# Patient Record
Sex: FEMALE | Race: BLACK OR AFRICAN AMERICAN | Employment: FULL TIME | URBAN - METROPOLITAN AREA
[De-identification: names, ages, dates, MRNs, and addresses within clinical notes are randomized per-mention and may not be internally consistent; named-entity substitution may affect disease eponyms.]

---

## 2019-05-06 ENCOUNTER — HOSPITAL ENCOUNTER (EMERGENCY)
Age: 24
Discharge: HOME | End: 2019-05-06
Attending: EMERGENCY MEDICINE
Payer: MEDICAID

## 2019-05-06 VITALS
OXYGEN SATURATION: 100 % | DIASTOLIC BLOOD PRESSURE: 83 MMHG | HEART RATE: 72 BPM | TEMPERATURE: 98.8 F | SYSTOLIC BLOOD PRESSURE: 125 MMHG | RESPIRATION RATE: 16 BRPM

## 2019-05-06 DIAGNOSIS — R31.9 URINARY TRACT INFECTION WITH HEMATURIA, SITE UNSPECIFIED: ICD-10-CM

## 2019-05-06 DIAGNOSIS — J06.9 UPPER RESPIRATORY TRACT INFECTION, UNSPECIFIED TYPE: Primary | ICD-10-CM

## 2019-05-06 DIAGNOSIS — N39.0 URINARY TRACT INFECTION WITH HEMATURIA, SITE UNSPECIFIED: ICD-10-CM

## 2019-05-06 LAB
APPEARANCE UR: ABNORMAL
BACTERIA URNS QL MICRO: NEGATIVE /HPF
BILIRUB UR QL: NEGATIVE
COLOR UR: ABNORMAL
EPITH CASTS URNS QL MICRO: ABNORMAL /LPF
FLUAV AG NPH QL IA: NEGATIVE
FLUBV AG NOSE QL IA: NEGATIVE
GLUCOSE UR STRIP.AUTO-MCNC: NEGATIVE MG/DL
HCG UR QL: NEGATIVE
HGB UR QL STRIP: ABNORMAL
HYALINE CASTS URNS QL MICRO: ABNORMAL /LPF (ref 0–5)
KETONES UR QL STRIP.AUTO: 40 MG/DL
LEUKOCYTE ESTERASE UR QL STRIP.AUTO: ABNORMAL
NITRITE UR QL STRIP.AUTO: NEGATIVE
PH UR STRIP: 5.5 [PH] (ref 5–8)
PROT UR STRIP-MCNC: ABNORMAL MG/DL
RBC #/AREA URNS HPF: ABNORMAL /HPF (ref 0–5)
S PYO AG THROAT QL: NEGATIVE
SP GR UR REFRACTOMETRY: 1.02 (ref 1–1.03)
UR CULT HOLD, URHOLD: NORMAL
UROBILINOGEN UR QL STRIP.AUTO: 1 EU/DL (ref 0.2–1)
WBC URNS QL MICRO: ABNORMAL /HPF (ref 0–4)

## 2019-05-06 PROCEDURE — 81001 URINALYSIS AUTO W/SCOPE: CPT

## 2019-05-06 PROCEDURE — 87804 INFLUENZA ASSAY W/OPTIC: CPT

## 2019-05-06 PROCEDURE — 81025 URINE PREGNANCY TEST: CPT

## 2019-05-06 PROCEDURE — 87880 STREP A ASSAY W/OPTIC: CPT

## 2019-05-06 PROCEDURE — 87086 URINE CULTURE/COLONY COUNT: CPT

## 2019-05-06 PROCEDURE — 99284 EMERGENCY DEPT VISIT MOD MDM: CPT

## 2019-05-06 PROCEDURE — 87070 CULTURE OTHR SPECIMN AEROBIC: CPT

## 2019-05-06 PROCEDURE — 74011250637 HC RX REV CODE- 250/637: Performed by: PHYSICIAN ASSISTANT

## 2019-05-06 RX ORDER — CEPHALEXIN 500 MG/1
500 CAPSULE ORAL 2 TIMES DAILY
Qty: 14 CAP | Refills: 0 | Status: SHIPPED | OUTPATIENT
Start: 2019-05-06 | End: 2019-05-13

## 2019-05-06 RX ORDER — IBUPROFEN 600 MG/1
600 TABLET ORAL
Status: COMPLETED | OUTPATIENT
Start: 2019-05-06 | End: 2019-05-06

## 2019-05-06 RX ORDER — CEPHALEXIN 500 MG/1
500 CAPSULE ORAL
Status: COMPLETED | OUTPATIENT
Start: 2019-05-06 | End: 2019-05-06

## 2019-05-06 RX ORDER — IBUPROFEN 600 MG/1
600 TABLET ORAL
Qty: 20 TAB | Refills: 0 | Status: SHIPPED | OUTPATIENT
Start: 2019-05-06 | End: 2019-09-06 | Stop reason: ALTCHOICE

## 2019-05-06 RX ADMIN — CEPHALEXIN 500 MG: 500 CAPSULE ORAL at 22:00

## 2019-05-06 RX ADMIN — IBUPROFEN 600 MG: 600 TABLET ORAL at 21:01

## 2019-05-06 NOTE — LETTER
[address] 
[address] [place], [place] [address]ng[facility] [place] [facility] [phone] 
[phone] Work/School Note Date: 5/6/2019 To Whom It May concern: 
 
Sandra Santiago was seen and treated today in the emergency room by the following provider(s): 
Attending Provider: Gunner Astudillo MD 
Physician Assistant: ADRIAN Colvin. Sandra Santiago may return to work on Thursday; sooner if symptoms improve. Sincerely, ADRIAN Allen

## 2019-05-07 NOTE — DISCHARGE INSTRUCTIONS
Patient Education        Upper Respiratory Infection (Cold): Care Instructions  Your Care Instructions    An upper respiratory infection, or URI, is an infection of the nose, sinuses, or throat. URIs are spread by coughs, sneezes, and direct contact. The common cold is the most frequent kind of URI. The flu and sinus infections are other kinds of URIs. Almost all URIs are caused by viruses. Antibiotics won't cure them. But you can treat most infections with home care. This may include drinking lots of fluids and taking over-the-counter pain medicine. You will probably feel better in 4 to 10 days. The doctor has checked you carefully, but problems can develop later. If you notice any problems or new symptoms, get medical treatment right away. Follow-up care is a key part of your treatment and safety. Be sure to make and go to all appointments, and call your doctor if you are having problems. It's also a good idea to know your test results and keep a list of the medicines you take. How can you care for yourself at home? · To prevent dehydration, drink plenty of fluids, enough so that your urine is light yellow or clear like water. Choose water and other caffeine-free clear liquids until you feel better. If you have kidney, heart, or liver disease and have to limit fluids, talk with your doctor before you increase the amount of fluids you drink. · Take an over-the-counter pain medicine, such as acetaminophen (Tylenol), ibuprofen (Advil, Motrin), or naproxen (Aleve). Read and follow all instructions on the label. · Before you use cough and cold medicines, check the label. These medicines may not be safe for young children or for people with certain health problems. · Be careful when taking over-the-counter cold or flu medicines and Tylenol at the same time. Many of these medicines have acetaminophen, which is Tylenol. Read the labels to make sure that you are not taking more than the recommended dose.  Too much acetaminophen (Tylenol) can be harmful. · Get plenty of rest.  · Do not smoke or allow others to smoke around you. If you need help quitting, talk to your doctor about stop-smoking programs and medicines. These can increase your chances of quitting for good. When should you call for help? Call 911 anytime you think you may need emergency care. For example, call if:    · You have severe trouble breathing.    Call your doctor now or seek immediate medical care if:    · You seem to be getting much sicker.     · You have new or worse trouble breathing.     · You have a new or higher fever.     · You have a new rash.    Watch closely for changes in your health, and be sure to contact your doctor if:    · You have a new symptom, such as a sore throat, an earache, or sinus pain.     · You cough more deeply or more often, especially if you notice more mucus or a change in the color of your mucus.     · You do not get better as expected. Where can you learn more? Go to http://olga-rico.info/. Enter Z165 in the search box to learn more about \"Upper Respiratory Infection (Cold): Care Instructions. \"  Current as of: September 5, 2018  Content Version: 11.9  © 5136-0255 [employer]. Care instructions adapted under license by [employer] (which disclaims liability or warranty for this information). If you have questions about a medical condition or this instruction, always ask your healthcare professional. [place] [address] any warranty or liability for your use of this information. Patient Education        Urinary Tract Infection in Women: Care Instructions  Your Care Instructions    A urinary tract infection, or UTI, is a general term for an infection anywhere between the kidneys and the urethra (where urine comes out). Most UTIs are bladder infections. They often cause pain or burning when you urinate.   UTIs are caused by bacteria and can be cured with antibiotics. Be sure to complete your treatment so that the infection goes away. Follow-up care is a key part of your treatment and safety. Be sure to make and go to all appointments, and call your doctor if you are having problems. It's also a good idea to know your test results and keep a list of the medicines you take. How can you care for yourself at home? · Take your antibiotics as directed. Do not stop taking them just because you feel better. You need to take the full course of antibiotics. · Drink extra water and other fluids for the next day or two. This may help wash out the bacteria that are causing the infection. (If you have kidney, heart, or liver disease and have to limit fluids, talk with your doctor before you increase your fluid intake.)  · Avoid drinks that are carbonated or have caffeine. They can irritate the bladder. · Urinate often. Try to empty your bladder each time. · To relieve pain, take a hot bath or lay a heating pad set on low over your lower belly or genital area. Never go to sleep with a heating pad in place. To prevent UTIs  · Drink plenty of water each day. This helps you urinate often, which clears bacteria from your system. (If you have kidney, heart, or liver disease and have to limit fluids, talk with your doctor before you increase your fluid intake.)  · Urinate when you need to. · Urinate right after you have sex. · Change sanitary pads often. · Avoid douches, bubble baths, feminine hygiene sprays, and other feminine hygiene products that have deodorants. · After going to the bathroom, wipe from front to back. When should you call for help? Call your doctor now or seek immediate medical care if:    · Symptoms such as fever, chills, nausea, or vomiting get worse or appear for the first time.     · You have new pain in your back just below your rib cage.  This is called flank pain.     · There is new blood or pus in your urine.     · You have any problems with your antibiotic medicine.    Watch closely for changes in your health, and be sure to contact your doctor if:    · You are not getting better after taking an antibiotic for 2 days.     · Your symptoms go away but then come back. Where can you learn more? Go to http://olga-rico.info/. Enter Y270 in the search box to learn more about \"Urinary Tract Infection in Women: Care Instructions. \"  Current as of: March 20, 2018  Content Version: 11.9  © 5999-4481 [employer], "[employer]. Care instructions adapted under license by [employer] (which disclaims liability or warranty for this information). If you have questions about a medical condition or this instruction, always ask your healthcare professional. [address] any warranty or liability for your use of this information.

## 2019-05-07 NOTE — ED PROVIDER NOTES
27 y.o. female with no significant past medical history, presents ambulatory to the ED accompanied by mother with chief complaint of generalized body aches. Patient states that yesterday (5/5/19) she developed generalized body aches, a sore throat, chills, and headaches. She took a dose of Theraflu yesterday without significant relief to her symptoms, and has not taken any medications today. Patient notes that her symptoms have persisted, and now she rates her discomfort as a 6/10 in severity. She additionally complains of a mild, non-productive cough. Patient denies known sick contacts, and also denies receiving her flu vaccine this season. She specifically denies measured fevers, chest pain, abdominal pain, or urinary symptoms. There are no other acute medical concerns at this time. Social hx: Denies Tobacco use; Denies EtOH use; Denies Illicit Drug Abuse    PCP: Stephanie Ryder MD     Note written by Breonna Pressley. Ruthie Patterson, as dictated by Flaco Cuadra PA-C 8:44 PM     The history is provided by the patient. No  was used. Past Medical History:   Diagnosis Date    Asthma        History reviewed. No pertinent surgical history. History reviewed. No pertinent family history.     Social History     Socioeconomic History    Marital status: SINGLE     Spouse name: Not on file    Number of children: Not on file    Years of education: Not on file    Highest education level: Not on file   Occupational History    Not on file   Social Needs    Financial resource strain: Not on file    Food insecurity:     Worry: Not on file     Inability: Not on file    Transportation needs:     Medical: Not on file     Non-medical: Not on file   Tobacco Use    Smoking status: Passive Smoke Exposure - Never Smoker    Smokeless tobacco: Current User   Substance and Sexual Activity    Alcohol use: Never     Frequency: Never    Drug use: Not on file    Sexual activity: Not on file Lifestyle    Physical activity:     Days per week: Not on file     Minutes per session: Not on file    Stress: Not on file   Relationships    Social connections:     Talks on phone: Not on file     Gets together: Not on file     Attends Yazidi service: Not on file     Active member of club or organization: Not on file     Attends meetings of clubs or organizations: Not on file     Relationship status: Not on file    Intimate partner violence:     Fear of current or ex partner: Not on file     Emotionally abused: Not on file     Physically abused: Not on file     Forced sexual activity: Not on file   Other Topics Concern    Not on file   Social History Narrative    Not on file         ALLERGIES: Patient has no known allergies. Review of Systems   Constitutional: Positive for chills. Negative for fever. HENT: Positive for sore throat. Negative for ear discharge. Eyes: Negative for photophobia, pain, discharge and visual disturbance. Respiratory: Positive for cough. Negative for apnea, chest tightness and shortness of breath. Cardiovascular: Negative for chest pain, palpitations and leg swelling. Gastrointestinal: Negative for abdominal distention, abdominal pain and blood in stool. Genitourinary: Negative for difficulty urinating, dysuria, flank pain, frequency and hematuria. Musculoskeletal: Positive for myalgias (generalized). Negative for back pain, gait problem, joint swelling and neck pain. Skin: Negative for color change and pallor. Neurological: Positive for headaches. Negative for dizziness, syncope, weakness and numbness. Psychiatric/Behavioral: Negative for behavioral problems and confusion. The patient is not nervous/anxious. Vitals:    05/06/19 1901 05/06/19 2049   BP:  (!) 147/99   Pulse: (!) 113 (!) 101   Resp:  16   Temp:  99.9 °F (37.7 °C)   SpO2: 99% 100%            Physical Exam   Constitutional: She is oriented to person, place, and time.  She appears well-nourished. No distress. HENT:   Head: Normocephalic and atraumatic. Nose: Rhinorrhea (clear) present. Mouth/Throat: Posterior oropharyngeal erythema (mild) present. No oropharyngeal exudate or posterior oropharyngeal edema. No tonsillar exudate. No difficulty swallowing   Eyes: Pupils are equal, round, and reactive to light. Neck: Normal range of motion. No meningeal signs   Cardiovascular: Normal rate and regular rhythm. Pulmonary/Chest: Effort normal and breath sounds normal.   Abdominal: Soft. There is no tenderness. Musculoskeletal: Normal range of motion. She exhibits no edema or tenderness. Neurological: She is alert and oriented to person, place, and time. Skin: Skin is warm and dry. Psychiatric: She has a normal mood and affect. Nursing note and vitals reviewed. Note written by Danford Frankel. Carole Ramirez, as dictated by Adis Johnson PA-C 8:44 PM        MDM  Number of Diagnoses or Management Options  Upper respiratory tract infection, unspecified type:   Urinary tract infection with hematuria, site unspecified:      Amount and/or Complexity of Data Reviewed  Clinical lab tests: ordered and reviewed  Discuss the patient with other providers: yes           Procedures    PROGRESS NOTE:  10:09 PM  Patient has been reassessed. Feeling better. Reviewed labs and medications with patient. Ready to discharge home. 10:11 PM  Patient's results have been reviewed with them. Patient and/or family have verbally conveyed their understanding and agreement of the patient's signs, symptoms, diagnosis, treatment and prognosis and additionally agree to follow up as recommended or return to the Emergency Room should their condition change prior to follow-up.   Discharge instructions have also been provided to the patient with some educational information regarding their diagnosis as well a list of reasons why they would want to return to the ER prior to their follow-up appointment should their condition change.    ADRIAN Kraft

## 2019-05-07 NOTE — ED TRIAGE NOTES
John comes in [facility]. \"I have a really bad sore throat. I also have chills and body aches real bad. \" States she took Thermaflu yesterday.

## 2019-05-07 NOTE — ED NOTES
Patient has been medically cleared for discharge at this time. She was given all discharge papers and education at this time. Seen leaving the department with a steady gait and all belongings.

## 2019-05-08 LAB
BACTERIA SPEC CULT: NORMAL
BACTERIA SPEC CULT: NORMAL
CC UR VC: NORMAL
SERVICE CMNT-IMP: NORMAL
SERVICE CMNT-IMP: NORMAL

## 2019-06-21 ENCOUNTER — HOSPITAL ENCOUNTER (EMERGENCY)
Age: 24
Discharge: HOME | End: 2019-06-21
Attending: EMERGENCY MEDICINE
Payer: MEDICAID

## 2019-06-21 ENCOUNTER — APPOINTMENT (OUTPATIENT)
Dept: ULTRASOUND IMAGING | Age: 24
End: 2019-06-21
Attending: NURSE PRACTITIONER
Payer: MEDICAID

## 2019-06-21 VITALS
DIASTOLIC BLOOD PRESSURE: 73 MMHG | SYSTOLIC BLOOD PRESSURE: 131 MMHG | TEMPERATURE: 98.6 F | HEART RATE: 85 BPM | RESPIRATION RATE: 16 BRPM | OXYGEN SATURATION: 100 %

## 2019-06-21 DIAGNOSIS — K21.9 GASTROESOPHAGEAL REFLUX DISEASE, ESOPHAGITIS PRESENCE NOT SPECIFIED: Primary | ICD-10-CM

## 2019-06-21 LAB
ALBUMIN SERPL-MCNC: 3.9 G/DL (ref 3.5–5)
ALBUMIN/GLOB SERPL: 1 {RATIO} (ref 1.1–2.2)
ALP SERPL-CCNC: 52 U/L (ref 45–117)
ALT SERPL-CCNC: 18 U/L (ref 12–78)
ANION GAP SERPL CALC-SCNC: 7 MMOL/L (ref 5–15)
APPEARANCE UR: CLEAR
AST SERPL-CCNC: 16 U/L (ref 15–37)
ATRIAL RATE: 81 BPM
BACTERIA URNS QL MICRO: NEGATIVE /HPF
BASOPHILS # BLD: 0 K/UL (ref 0–0.1)
BASOPHILS NFR BLD: 0 % (ref 0–1)
BILIRUB SERPL-MCNC: 0.2 MG/DL (ref 0.2–1)
BILIRUB UR QL: NEGATIVE
BUN SERPL-MCNC: 12 MG/DL (ref 6–20)
BUN/CREAT SERPL: 16 (ref 12–20)
CALCIUM SERPL-MCNC: 9.1 MG/DL (ref 8.5–10.1)
CALCULATED R AXIS, ECG10: 88 DEGREES
CALCULATED T AXIS, ECG11: 70 DEGREES
CHLORIDE SERPL-SCNC: 109 MMOL/L (ref 97–108)
CO2 SERPL-SCNC: 25 MMOL/L (ref 21–32)
COLOR UR: ABNORMAL
COMMENT, HOLDF: NORMAL
CREAT SERPL-MCNC: 0.76 MG/DL (ref 0.55–1.02)
DIAGNOSIS, 93000: NORMAL
DIFFERENTIAL METHOD BLD: NORMAL
EOSINOPHIL # BLD: 0.2 K/UL (ref 0–0.4)
EOSINOPHIL NFR BLD: 2 % (ref 0–7)
EPITH CASTS URNS QL MICRO: ABNORMAL /LPF
ERYTHROCYTE [DISTWIDTH] IN BLOOD BY AUTOMATED COUNT: 12.8 % (ref 11.5–14.5)
GLOBULIN SER CALC-MCNC: 4.1 G/DL (ref 2–4)
GLUCOSE SERPL-MCNC: 76 MG/DL (ref 65–100)
GLUCOSE UR STRIP.AUTO-MCNC: NEGATIVE MG/DL
HCG UR QL: NEGATIVE
HCT VFR BLD AUTO: 37.5 % (ref 35–47)
HGB BLD-MCNC: 12.2 G/DL (ref 11.5–16)
HGB UR QL STRIP: ABNORMAL
HYALINE CASTS URNS QL MICRO: ABNORMAL /LPF (ref 0–5)
IMM GRANULOCYTES # BLD AUTO: 0 K/UL (ref 0–0.04)
IMM GRANULOCYTES NFR BLD AUTO: 0 % (ref 0–0.5)
KETONES UR QL STRIP.AUTO: NEGATIVE MG/DL
LEUKOCYTE ESTERASE UR QL STRIP.AUTO: NEGATIVE
LIPASE SERPL-CCNC: 109 U/L (ref 73–393)
LYMPHOCYTES # BLD: 2.5 K/UL (ref 0.8–3.5)
LYMPHOCYTES NFR BLD: 29 % (ref 12–49)
MCH RBC QN AUTO: 29.9 PG (ref 26–34)
MCHC RBC AUTO-ENTMCNC: 32.5 G/DL (ref 30–36.5)
MCV RBC AUTO: 91.9 FL (ref 80–99)
MONOCYTES # BLD: 0.7 K/UL (ref 0–1)
MONOCYTES NFR BLD: 8 % (ref 5–13)
NEUTS SEG # BLD: 5.3 K/UL (ref 1.8–8)
NEUTS SEG NFR BLD: 61 % (ref 32–75)
NITRITE UR QL STRIP.AUTO: NEGATIVE
NRBC # BLD: 0 K/UL (ref 0–0.01)
NRBC BLD-RTO: 0 PER 100 WBC
PH UR STRIP: 5.5 [PH] (ref 5–8)
PLATELET # BLD AUTO: 292 K/UL (ref 150–400)
PMV BLD AUTO: 11.7 FL (ref 8.9–12.9)
POTASSIUM SERPL-SCNC: 3.3 MMOL/L (ref 3.5–5.1)
PROT SERPL-MCNC: 8 G/DL (ref 6.4–8.2)
PROT UR STRIP-MCNC: NEGATIVE MG/DL
Q-T INTERVAL, ECG07: 360 MS
QRS DURATION, ECG06: 82 MS
QTC CALCULATION (BEZET), ECG08: 420 MS
RBC # BLD AUTO: 4.08 M/UL (ref 3.8–5.2)
RBC #/AREA URNS HPF: ABNORMAL /HPF (ref 0–5)
SAMPLES BEING HELD,HOLD: NORMAL
SODIUM SERPL-SCNC: 141 MMOL/L (ref 136–145)
SP GR UR REFRACTOMETRY: 1.02 (ref 1–1.03)
TROPONIN I BLD-MCNC: <0.04 NG/ML (ref 0–0.08)
UROBILINOGEN UR QL STRIP.AUTO: 0.2 EU/DL (ref 0.2–1)
VENTRICULAR RATE, ECG03: 82 BPM
WBC # BLD AUTO: 8.7 K/UL (ref 3.6–11)
WBC URNS QL MICRO: ABNORMAL /HPF (ref 0–4)

## 2019-06-21 PROCEDURE — 96374 THER/PROPH/DIAG INJ IV PUSH: CPT

## 2019-06-21 PROCEDURE — 83690 ASSAY OF LIPASE: CPT

## 2019-06-21 PROCEDURE — 81001 URINALYSIS AUTO W/SCOPE: CPT

## 2019-06-21 PROCEDURE — 85025 COMPLETE CBC W/AUTO DIFF WBC: CPT

## 2019-06-21 PROCEDURE — 81025 URINE PREGNANCY TEST: CPT

## 2019-06-21 PROCEDURE — 76705 ECHO EXAM OF ABDOMEN: CPT

## 2019-06-21 PROCEDURE — 80053 COMPREHEN METABOLIC PANEL: CPT

## 2019-06-21 PROCEDURE — 74011250636 HC RX REV CODE- 250/636: Performed by: NURSE PRACTITIONER

## 2019-06-21 PROCEDURE — 99284 EMERGENCY DEPT VISIT MOD MDM: CPT

## 2019-06-21 PROCEDURE — 36415 COLL VENOUS BLD VENIPUNCTURE: CPT

## 2019-06-21 PROCEDURE — 74011250637 HC RX REV CODE- 250/637: Performed by: NURSE PRACTITIONER

## 2019-06-21 PROCEDURE — 74011000250 HC RX REV CODE- 250: Performed by: NURSE PRACTITIONER

## 2019-06-21 PROCEDURE — 84484 ASSAY OF TROPONIN QUANT: CPT

## 2019-06-21 PROCEDURE — 93005 ELECTROCARDIOGRAM TRACING: CPT

## 2019-06-21 RX ORDER — IBUPROFEN 600 MG/1
TABLET ORAL
Refills: 0 | COMMUNITY
Start: 2019-05-07 | End: 2019-09-06 | Stop reason: ALTCHOICE

## 2019-06-21 RX ORDER — FAMOTIDINE 20 MG/1
20 TABLET, FILM COATED ORAL 2 TIMES DAILY
Qty: 20 TAB | Refills: 0 | Status: SHIPPED | OUTPATIENT
Start: 2019-06-21 | End: 2019-07-01

## 2019-06-21 RX ADMIN — FAMOTIDINE 20 MG: 10 INJECTION, SOLUTION INTRAVENOUS at 12:34

## 2019-06-21 RX ADMIN — LIDOCAINE HYDROCHLORIDE 40 ML: 20 SOLUTION ORAL; TOPICAL at 12:33

## 2019-06-21 NOTE — ED PROVIDER NOTES
Initial Complaint: Epigastric Abdominal pain that radiates up to chest    Started: 2 days ago    Endorses: Worse than menstrual cramping. CP. Denies: N/V, SOB, F/C, diarrhea or constipation    LMP: June 18. Has been taking motrin for pain for cramps. Does have bad menstrual cycles, but this is worse than usual    Family history of endometriosis. Never had pelvic exam. Not sexual active. Made better: nothing  Made worse: nothing    No further complaints. No past medical history on file. No past surgical history on file. Reviewed      Primary care provider: No primary care provider on file. The history is provided by the patient and a parent. No  was used. No past medical history on file. No past surgical history on file. No family history on file.     Social History     Socioeconomic History    Marital status: Not on file     Spouse name: Not on file    Number of children: Not on file    Years of education: Not on file    Highest education level: Not on file   Occupational History    Not on file   Social Needs    Financial resource strain: Not on file    Food insecurity:     Worry: Not on file     Inability: Not on file    Transportation needs:     Medical: Not on file     Non-medical: Not on file   Tobacco Use    Smoking status: Not on file   Substance and Sexual Activity    Alcohol use: Not on file    Drug use: Not on file    Sexual activity: Not on file   Lifestyle    Physical activity:     Days per week: Not on file     Minutes per session: Not on file    Stress: Not on file   Relationships    Social connections:     Talks on phone: Not on file     Gets together: Not on file     Attends Quaker service: Not on file     Active member of club or organization: Not on file     Attends meetings of clubs or organizations: Not on file     Relationship status: Not on file    Intimate partner violence:     Fear of current or ex partner: Not on file Emotionally abused: Not on file     Physically abused: Not on file     Forced sexual activity: Not on file   Other Topics Concern    Not on file   Social History Narrative    Not on file     ALLERGIES: Patient has no allergy information on record. Review of Systems   Constitutional: Negative for chills, fatigue and fever. Respiratory: Negative for chest tightness and shortness of breath. Cardiovascular: Positive for chest pain. Gastrointestinal: Positive for abdominal pain. Negative for abdominal distention, blood in stool, constipation, diarrhea, nausea and vomiting. Genitourinary: Negative for dysuria and menstrual problem. Musculoskeletal: Negative. Psychiatric/Behavioral: Negative. All other systems reviewed and are negative. Vitals:    06/21/19 1157 06/21/19 1211   BP:  131/73   Pulse: 85 85   Resp:  16   Temp:  98.6 °F (37 °C)   SpO2: 100% 100%          Physical Exam   Constitutional: She is oriented to person, place, and time. She appears well-developed and well-nourished. HENT:   Head: Normocephalic and atraumatic. Neck: Normal range of motion. Neck supple. Cardiovascular: Normal rate, regular rhythm, normal heart sounds and intact distal pulses. Pulmonary/Chest: Effort normal and breath sounds normal. No respiratory distress. She has no wheezes. She has no rales. She exhibits no tenderness. Abdominal: Soft. Normal appearance and bowel sounds are normal. There is generalized tenderness. There is guarding. There is no rigidity, no rebound and no CVA tenderness. Musculoskeletal: Normal range of motion. Neurological: She is alert and oriented to person, place, and time. Skin: Skin is warm and dry. No erythema. Psychiatric: She has a normal mood and affect. Her behavior is normal. Judgment and thought content normal.   Nursing note and vitals reviewed.      [facility]       Procedures    Assessment & Plan:     Orders Placed This Encounter    URINE CULTURE HOLD SAMPLE    US ABD LTD    CBC WITH AUTOMATED DIFF    METABOLIC PANEL, COMPREHENSIVE    LIPASE    Hold Sample    URINALYSIS W/MICROSCOPIC    POC URINE PREGNANCY TEST    POC TROPONIN-I    EKG 12 LEAD INITIAL    ibuprofen (MOTRIN) 600 mg tablet    mylanta/viscous lidocaine (GI COCKTAIL)    famotidine (PF) (PEPCID) 20 mg in sodium chloride 0.9% 10 mL injection       Discussed with Feliciano Andrea MD,ED Provider    Fadumo Escalante NP  06/21/19  12:05 PM    Labs and imaging without acute findings. Follow-up with PCP. Discussed return precautions. Pepcid BID x 10 days. Referral to GI.       LABORATORY TESTS:  Labs Reviewed   METABOLIC PANEL, COMPREHENSIVE - Abnormal; Notable for the following components:       Result Value    Potassium 3.3 (*)     Chloride 109 (*)     Globulin 4.1 (*)     A-G Ratio 1.0 (*)     All other components within normal limits   URINALYSIS W/MICROSCOPIC - Abnormal; Notable for the following components:    Blood MODERATE (*)     All other components within normal limits   URINE CULTURE HOLD SAMPLE   CBC WITH AUTOMATED DIFF   LIPASE   SAMPLES BEING HELD   HCG URINE, QL. - POC   POC TROPONIN-I   POC TROPONIN-I       IMAGING RESULTS:  [employer]    Result Date: 6/21/2019  ULTRASOUND OF THE RIGHT UPPER QUADRANT INDICATION: Epigasrtic/RUQ pain COMPARISON: None. TECHNIQUE: Sonography of the right upper quadrant was performed. FINDINGS: GALLBLADDER: No gallstones, wall thickening, or pericholecystic fluid. COMMON DUCT: 0.2 cm in diameter. The duct is normal caliber. LIVER: Normal echogenicity. No focal hepatic mass or intrahepatic biliary dilatation is shown. PANCREAS: The visualized portions of the pancreas are normal. RIGHT KIDNEY: 9.2 cm in length. No hydronephrosis, shadowing calculus, or contour-deforming renal mass. IMPRESSION: Normal right upper quadrant ultrasound.     MEDICATIONS GIVEN:  Medications   mylanta/viscous lidocaine (GI COCKTAIL) (40 mL Oral Given 6/21/19 1233)   famotidine (PF) (PEPCID) 20 mg in sodium chloride 0.9% 10 mL injection (20 mg IntraVENous Given 6/21/19 1234)       IMPRESSION:  1. Gastroesophageal reflux disease, esophagitis presence not specified        PLAN:  1. Current Discharge Medication List      START taking these medications    Details   famotidine (PEPCID) 20 mg tablet Take 1 Tab by mouth two (2) times a day for 10 days. Qty: 20 Tab, Refills: 0           2. Follow-up Information     Follow up With Specialties Details Why Contact Info    Kuldeep Ward MD Internal Medicine Schedule an appointment as soon as possible for a visit  [address]  [address] [phone]      [facility]  Schedule an appointment as soon as possible for a visit If symptoms worsen nor do not improve [address] [facility] [phone]    [place] [address] [facility] Emergency Medicine  As needed, If symptoms worsen [facility] [phone]  [phone]        3.      Return to ED for new or worsening symptoms       Lo Merino NP

## 2019-06-21 NOTE — ED NOTES
1238: Patient to [address] via wheelchair accompanied by Jennie

## 2019-06-21 NOTE — DISCHARGE INSTRUCTIONS
Thank you for allowing us to care for you today. Please follow-up with your Primary Care provider in the next 2-3 days if your symptoms do not improve. Plan for home:     Pepcid twice daily for 10 days. Follow-up with Gastroenterology if symptoms do not improve. May be related to Ibuprofen use. Come back to the ER if you have worsening symptoms, vomiting blood, fevers over 100.9, shaking chills, nausea or vomiting. Patient Education        Gastroesophageal Reflux Disease (GERD): Care Instructions  Your Care Instructions    Gastroesophageal reflux disease (GERD) is the backward flow of stomach acid into the esophagus. The esophagus is the tube that leads from your throat to your stomach. A one-way valve prevents the stomach acid from moving up into this tube. When you have GERD, this valve does not close tightly enough. If you have mild GERD symptoms including heartburn, you may be able to control the problem with antacids or over-the-counter medicine. Changing your diet, losing weight, and making other lifestyle changes can also help reduce symptoms. Follow-up care is a key part of your treatment and safety. Be sure to make and go to all appointments, and call your doctor if you are having problems. It's also a good idea to know your test results and keep a list of the medicines you take. How can you care for yourself at home? · Take your medicines exactly as prescribed. Call your doctor if you think you are having a problem with your medicine. · Your doctor may recommend over-the-counter medicine. For mild or occasional indigestion, antacids, such as Tums, Gaviscon, Mylanta, or Maalox, may help. Your doctor also may recommend over-the-counter acid reducers, such as Pepcid AC, Tagamet HB, Zantac 75, or Prilosec. Read and follow all instructions on the label. If you use these medicines often, talk with your doctor. · Change your eating habits.   ? It's best to eat several small meals instead of two or three large meals. ? After you eat, wait 2 to 3 hours before you lie down. ? Chocolate, mint, and alcohol can make GERD worse. ? Spicy foods, foods that have a lot of acid (like tomatoes and oranges), and coffee can make GERD symptoms worse in some people. If your symptoms are worse after you eat a certain food, you may want to stop eating that food to see if your symptoms get better. · Do not smoke or chew tobacco. Smoking can make GERD worse. If you need help quitting, talk to your doctor about stop-smoking programs and medicines. These can increase your chances of quitting for good. · If you have GERD symptoms at night, raise the head of your bed 6 to 8 inches by putting the frame on blocks or placing a foam wedge under the head of your mattress. (Adding extra pillows does not work.)  · Do not wear tight clothing around your middle. · Lose weight if you need to. Losing just 5 to 10 pounds can help. When should you call for help? Call your doctor now or seek immediate medical care if:    · You have new or different belly pain.     · Your stools are black and tarlike or have streaks of blood.    Watch closely for changes in your health, and be sure to contact your doctor if:    · Your symptoms have not improved after 2 days.     · Food seems to catch in your throat or chest.   Where can you learn more? Go to http://olga-rico.info/. Enter T136 in the search box to learn more about \"Gastroesophageal Reflux Disease (GERD): Care Instructions. \"  Current as of: March 27, 2018  Content Version: 11.9  © 3067-4102 [employer]. Care instructions adapted under license by [employer] (which disclaims liability or warranty for this information). If you have questions about a medical condition or this instruction, always ask your healthcare professional. [address] any warranty or liability for your use of this information.

## 2019-06-21 NOTE — ED NOTES
Pt ambulatory out of ED with discharge instructions and prescriptions in hand given by Jesusita Albrecht NP; pt verbalized understanding of discharge paperwork and time allotted for questions. VSS. Pt alert and oriented.

## 2019-06-21 NOTE — LETTER
[address] 
[address] [facility] [id number] 
[phone] Work/School Note Date: 6/21/2019 To Whom It May concern: 
 
Hailey Andrade was seen and treated today in the emergency room by the following provider(s): 
Attending Provider: Jasen Whittington MD 
Nurse Practitioner: Lisa Puri NP. Hailey Andrade may return to work on June 22, 2019. Sincerely, Melissa Muniz NP

## 2019-06-22 LAB — UR CULT HOLD, URHOLD: NORMAL

## 2019-09-06 ENCOUNTER — OFFICE VISIT (OUTPATIENT)
Dept: BEHAVIORAL/MENTAL HEALTH CLINIC | Age: 24
End: 2019-09-06

## 2019-09-06 VITALS
HEART RATE: 74 BPM | DIASTOLIC BLOOD PRESSURE: 75 MMHG | SYSTOLIC BLOOD PRESSURE: 125 MMHG | HEIGHT: 60 IN | BODY MASS INDEX: 21.99 KG/M2 | WEIGHT: 112 LBS

## 2019-09-06 DIAGNOSIS — F32.1 MAJOR DEPRESSIVE DISORDER, SINGLE EPISODE, MODERATE WITH ANXIOUS DISTRESS (HCC): Primary | ICD-10-CM

## 2019-09-06 DIAGNOSIS — Z91.89 COMPLIANCE WITH MEDICATION REGIMEN: ICD-10-CM

## 2019-09-06 DIAGNOSIS — Z86.59 HISTORY OF ATTENTION DEFICIT HYPERACTIVITY DISORDER (ADHD): ICD-10-CM

## 2019-09-06 RX ORDER — BUPROPION HYDROCHLORIDE 150 MG/1
150 TABLET ORAL
Qty: 30 TAB | Refills: 2 | Status: SHIPPED | OUTPATIENT
Start: 2019-09-06 | End: 2019-12-18 | Stop reason: SINTOL

## 2019-09-06 RX ORDER — DEXTROAMPHETAMINE SACCHARATE, AMPHETAMINE ASPARTATE, DEXTROAMPHETAMINE SULFATE AND AMPHETAMINE SULFATE 2.5; 2.5; 2.5; 2.5 MG/1; MG/1; MG/1; MG/1
10 TABLET ORAL 2 TIMES DAILY
Qty: 60 TAB | Refills: 0 | Status: SHIPPED | OUTPATIENT
Start: 2019-09-06 | End: 2019-11-21 | Stop reason: SDUPTHER

## 2019-09-06 RX ORDER — BUPROPION HYDROCHLORIDE 150 MG/1
150 TABLET ORAL
Qty: 30 TAB | Refills: 0 | Status: SHIPPED | OUTPATIENT
Start: 2019-09-06 | End: 2019-09-06

## 2019-09-06 NOTE — PROGRESS NOTES
INITIAL PSYCHIATRIC EVALUATION    IDENTIFICATION:      A. Name: Jyotsna Heart Age:     27 y.o.      C.   MRN: [id number]       D.   CSN:      [id number]      E. Admission Date: (Not on file)       PETRONA   :     1995          SOURCE OF INFORMATION: The patient, past records        CHIEF COMPLAINT:  \" anxiety, depression, poor concentration\"    HPI: Miss Lilo Rodriguez is a 28 y/o single AA female , a sophomore at [employer] who presents to establish care at this clinic. She has been escorted by her mother, Demetrius Marsh. She has been struggling with symptoms of anxious depression for the past few months since her grades soared downwards where she could not complete her tasks, home works,projects and her GPA declined. She received a letter that her financial aid would be cut off unless she raised her grades this semester. IN addition her mother, Fabiana reports that her daughter remains isolative, lacking a social [place] of friends, being home bound. She indicated problems in school for which she was evaluated by [facility] and treated with Adderall which she did not like, \" don't like to take medications/drugs\". Further exploration indicated a lack of interest in sex, men, relationships, but more concerned about her future career,etc. She denies any suicidal thoughts but endorsed them on the screening form because she has  had fleeting thoughts when in doubt of succeeding in school and achieving her goals of becoming a . She has been isolative, withdrawn, sleeping excessively (7am-7pm),sleep pattern is erratic, lacks appetite, does not eat much. She was clearly anhedonic and apathetic  for a 28 y/o. She only smiled once.      Current symptoms: depressed, anxious, tense,irritable, insomnia,concentration deficits,anhedonia,fatigue,appetite ,feeling bad about self,fidgety,death wishes,distractible,racing thoughts,    Duration of symptoms: for past few months      PHQ-9 scores:21/27  HAM-A scores:  Mood Disorder Questionaire scores:3/13      STRESSORS/ Precipitating factors:  School grades,uncertain future    PERSONAL COPING STYLEs :  Has been explosive in the past, breaking things ( per mom), go to sleep, listen to music           REVIEW OF  PSYCHIATRIC SYSTEMS:  Patient did not meet criteria for a Major Depressive Disorder ( W OR W/O  psychotic features) PTSD, Schizophrenia, Bipolar Affective Disorder, Obsessive Compulsive Disorder, Anxiety Disorder, Agoraphobia, EATING DISORDER,SUBSTANCE USE DISORDER,  Psychotic disorders or ASD. PAST PSYCHIATRIC HISTORY:   Outpatient Psychiatric treatments:Treated for ADHD at age 11 (after father's death),[facility],saw  in , but he left. Suicide attempts/self inflictive behaviors:none   Hospitalizations:  none  Medications Tried: Adderall   ECT:   None   Legal/Assault History:  None     PAST SUBSTANCE ABUSE HISTORY:  Denies any drug use, detests alcohol, ( avoids them since her mother was an alcoholic)    FAMILY PSYCH HISTORY: mother and grandmother suffer from depression, father also had alcohol/drug problems    SOCIAL HISTORY: She is the youngest of 4 girls born to a single mother,father  when she was 11, and she lived with her paternal grandmother , then with her stepmother, then with half sister (paternal) ,mother did not have custody due to her drug use. Stayed with mother for the rest of that time. Denies any sexual and physical abuse. Repeated 7th grade. Did graduate from [employer]. Currently in college. Worked for [employer] ( 4months),  Seasonal jobs with [employer], [address] [place] [address]    PAST MEDICAL/SURGICAL HISTORY:  Unremarkable  Other than asthma    Medical review of systems mainly considered within normal limits expect as noted in history above. Pertinent LABS:as of 2019, her CBCd were, CMP,UA were all WNl      ALLERGIES:   She has No Known Allergies.       MENTAL STATUS EXAM:  Orientation person, place, time/date, situation, day of week, month of year and year    Behavior/Eye contact: Good eye contact   Appearance:  Well kempt,appearing his/her age   Motor Behavior:  within normal limits   Speech:  normal pitch, normal volume and non-pressured   Thought Process: goal directed and within normal limits   Thought Content free of delusions and free of hallucinations   Suicidal ideations no plan  and no intention   Homicidal ideations no plan  and no intention   Mood:  Depressed, anxious, irritable   Affect:  mood-congruent, restricted, dysphoric affect   Memory recent  adequate ( 3/3 at 5 minutes)   Memory remote:  adequate recalled the presidents   Concentration:  adequate did well with \"egg question\" and reversal of the months of the year   Abstraction:  abstract ( did well with similarities)   Insight:  intact   Reliability good   Perceptual disortions  Absent( auditory,visual,olfactory,tactile), patient denied         ASSESSMENT:  The patient is a 27 y.o.  female with symptoms of clinical depression, probably chronic but worse in the past few months when pressure has mounted due to educational/cely requirements. She has self doubt and poor self esteem, feeling marginalized by her mother. Suicide/Homicide risk : (Nil,Low, Moderate, High): low    STRENGTHS: healthy, absence of drug abuse, supportive family, insightful, seeking intervention  WEAKNESSES: genetic loading for depression and drug abuse, lack of meaningful relationships outside of the home, lack of leisure activities, maladaptive coping    PROVISIONAL DIAGNOSES:    ICD-10-CM ICD-9-CM   1. Major depressive disorder, single episode, moderate with anxious distress (HCC) F32.1 296.22   2. History of attention deficit hyperactivity disorder (ADHD) Z86.59 V11.8       Orders Placed This Encounter    buPROPion XL (WELLBUTRIN XL) 150 mg tablet     Sig: Take 1 Tab by mouth every morning.      Dispense:  30 Tab     Refill:  0    dextroamphetamine-amphetamine (ADDERALL) 10 mg tablet     Sig: Take 1 Tab by mouth two (2) times a day. Max Daily Amount: 20 mg. Dispense:  60 Tab     Refill:  0       TREATMENT PLAN:       1. Medications: Will start on low Dose Wellbutrin XL 150mg daily and provide Adderall for ADD and monitor closely. The risks and benefits of the proposed medications, including addiction potential with Adderrall, the potential medication side effects and consequences of non-compliance were dicussed. The  patient was given opportunity to ask questions             2. Counseling/ psychotherapy:  Psych-education provided:none  Discussed rational versus irrational thinking patterns and their consequences. Discussed healthy/adaptive and unhealthy/maladaptive coping. 3.  Labs/ tests/ old records/ collateral: consider in the next visit, UDS, TSH,and vitamin levels if she does not respond    4. Follow up : 4-5 weeks    5: If you feel suicidal after hours, please call the [address] [place] [address] @ [phone] OR GO TO THE NEAREST [address] [email or web address] YOU MAY ALSO ACCESS THE SUICIDE HOTLINE @ \"SPEAKING OF SUICIDE. COM/RESOURCES\"    Referrals/Consults: needs individual therapy ( she has probably repressed feelings/memories of the past losses,changes,etc)    Ms. Prateek Marquez has a reminder for a \"due or due soon\" health maintenance. I have asked that she contact her primary care provider for follow-up on this health maintenance. TIME SPENT FACE TO FACE: 61 MINUTES                  SIGNED:    Sultana SHAVONNE Tatum MD,   Adult Psychiatrist/Psychosomatic Medicine  9/6/2019

## 2019-09-14 LAB
ALPRAZ UR QL: NEGATIVE
AMPHETAMINES UR QL SCN: NEGATIVE NG/ML
BARBITURATES UR QL SCN: NEGATIVE NG/ML
BENZODIAZ UR QL: NEGATIVE NG/ML
BZE UR QL SCN: NEGATIVE NG/ML
CANNABINOIDS UR QL SCN: NEGATIVE NG/ML
CLONAZEPAM UR QL: NEGATIVE
CREAT UR-MCNC: 131.4 MG/DL (ref 20–300)
FLURAZEPAM UR QL: NEGATIVE
LORAZEPAM UR QL: NEGATIVE
METHADONE UR QL SCN: NEGATIVE NG/ML
MIDAZOLAM UR QL CFM: NEGATIVE
NORDIAZEPAM UR QL: NEGATIVE
OPIATES UR QL SCN: NEGATIVE NG/ML
OXAZEPAM UR QL: NEGATIVE
OXYCODONE+OXYMORPHONE UR QL SCN: NEGATIVE NG/ML
PCP UR QL: NEGATIVE NG/ML
PH UR: 7.4 [PH] (ref 4.5–8.9)
PLEASE NOTE:, 733157: NORMAL
PROPOXYPH UR QL SCN: NEGATIVE NG/ML
SP GR UR: 1.01
TEMAZEPAM UR QL CFM: NEGATIVE
TRIAZOLAM UR QL: NEGATIVE

## 2019-09-20 ENCOUNTER — HOSPITAL ENCOUNTER (EMERGENCY)
Age: 24
Discharge: HOME | End: 2019-09-20
Attending: EMERGENCY MEDICINE | Admitting: EMERGENCY MEDICINE
Payer: MEDICAID

## 2019-09-20 ENCOUNTER — APPOINTMENT (OUTPATIENT)
Dept: GENERAL RADIOLOGY | Age: 24
End: 2019-09-20
Attending: EMERGENCY MEDICINE
Payer: MEDICAID

## 2019-09-20 VITALS
DIASTOLIC BLOOD PRESSURE: 86 MMHG | SYSTOLIC BLOOD PRESSURE: 125 MMHG | TEMPERATURE: 98.2 F | OXYGEN SATURATION: 99 % | HEIGHT: 59 IN | HEART RATE: 89 BPM | WEIGHT: 108 LBS | BODY MASS INDEX: 21.77 KG/M2 | RESPIRATION RATE: 20 BRPM

## 2019-09-20 DIAGNOSIS — R07.89 ATYPICAL CHEST PAIN: Primary | ICD-10-CM

## 2019-09-20 LAB
ANION GAP SERPL CALC-SCNC: 9 MMOL/L (ref 5–15)
BASOPHILS # BLD: 0 K/UL (ref 0–0.1)
BASOPHILS NFR BLD: 0 % (ref 0–1)
BUN SERPL-MCNC: 20 MG/DL (ref 6–20)
BUN/CREAT SERPL: 21 (ref 12–20)
CALCIUM SERPL-MCNC: 9.4 MG/DL (ref 8.5–10.1)
CHLORIDE SERPL-SCNC: 102 MMOL/L (ref 97–108)
CO2 SERPL-SCNC: 27 MMOL/L (ref 21–32)
CREAT SERPL-MCNC: 0.96 MG/DL (ref 0.55–1.02)
D DIMER PPP FEU-MCNC: <0.19 MG/L FEU (ref 0–0.65)
DIFFERENTIAL METHOD BLD: NORMAL
EOSINOPHIL # BLD: 0.1 K/UL (ref 0–0.4)
EOSINOPHIL NFR BLD: 2 % (ref 0–7)
ERYTHROCYTE [DISTWIDTH] IN BLOOD BY AUTOMATED COUNT: 11.9 % (ref 11.5–14.5)
GLUCOSE SERPL-MCNC: 83 MG/DL (ref 65–100)
HCG SERPL QL: NEGATIVE
HCT VFR BLD AUTO: 39 % (ref 35–47)
HGB BLD-MCNC: 12.7 G/DL (ref 11.5–16)
IMM GRANULOCYTES # BLD AUTO: 0 K/UL (ref 0–0.04)
IMM GRANULOCYTES NFR BLD AUTO: 0 % (ref 0–0.5)
LYMPHOCYTES # BLD: 1.9 K/UL (ref 0.8–3.5)
LYMPHOCYTES NFR BLD: 37 % (ref 12–49)
MCH RBC QN AUTO: 29.3 PG (ref 26–34)
MCHC RBC AUTO-ENTMCNC: 32.6 G/DL (ref 30–36.5)
MCV RBC AUTO: 90.1 FL (ref 80–99)
MONOCYTES # BLD: 0.4 K/UL (ref 0–1)
MONOCYTES NFR BLD: 8 % (ref 5–13)
NEUTS SEG # BLD: 2.7 K/UL (ref 1.8–8)
NEUTS SEG NFR BLD: 53 % (ref 32–75)
NRBC # BLD: 0 K/UL (ref 0–0.01)
NRBC BLD-RTO: 0 PER 100 WBC
PLATELET # BLD AUTO: 255 K/UL (ref 150–400)
PMV BLD AUTO: 11.1 FL (ref 8.9–12.9)
POTASSIUM SERPL-SCNC: 3.7 MMOL/L (ref 3.5–5.1)
RBC # BLD AUTO: 4.33 M/UL (ref 3.8–5.2)
SODIUM SERPL-SCNC: 138 MMOL/L (ref 136–145)
WBC # BLD AUTO: 5 K/UL (ref 3.6–11)

## 2019-09-20 PROCEDURE — 74011000250 HC RX REV CODE- 250: Performed by: EMERGENCY MEDICINE

## 2019-09-20 PROCEDURE — 71046 X-RAY EXAM CHEST 2 VIEWS: CPT

## 2019-09-20 PROCEDURE — 99284 EMERGENCY DEPT VISIT MOD MDM: CPT

## 2019-09-20 PROCEDURE — 93005 ELECTROCARDIOGRAM TRACING: CPT

## 2019-09-20 PROCEDURE — 36415 COLL VENOUS BLD VENIPUNCTURE: CPT

## 2019-09-20 PROCEDURE — 85379 FIBRIN DEGRADATION QUANT: CPT

## 2019-09-20 PROCEDURE — 84703 CHORIONIC GONADOTROPIN ASSAY: CPT

## 2019-09-20 PROCEDURE — 80048 BASIC METABOLIC PNL TOTAL CA: CPT

## 2019-09-20 PROCEDURE — 74011250637 HC RX REV CODE- 250/637: Performed by: EMERGENCY MEDICINE

## 2019-09-20 PROCEDURE — 85025 COMPLETE CBC W/AUTO DIFF WBC: CPT

## 2019-09-20 RX ORDER — FAMOTIDINE 20 MG/1
20 TABLET, FILM COATED ORAL 2 TIMES DAILY
Qty: 20 TAB | Refills: 0 | Status: SHIPPED | OUTPATIENT
Start: 2019-09-20 | End: 2019-09-30

## 2019-09-20 RX ORDER — ACETAMINOPHEN 500 MG
1000 TABLET ORAL
Status: DISCONTINUED | OUTPATIENT
Start: 2019-09-20 | End: 2019-09-20 | Stop reason: HOSPADM

## 2019-09-20 RX ADMIN — LIDOCAINE HYDROCHLORIDE 40 ML: 20 SOLUTION ORAL; TOPICAL at 18:08

## 2019-09-20 NOTE — DISCHARGE INSTRUCTIONS

## 2019-09-20 NOTE — ED NOTES
Emergency Department Nursing Plan of Care       The Nursing Plan of Care is developed from the Nursing assessment and Emergency Department Attending provider initial evaluation. The plan of care may be reviewed in the ED Provider note.     The Plan of Care was developed with the following considerations:   Patient / Family readiness to learn indicated by:verbalized understanding  Persons(s) to be included in education: patient  Barriers to Learning/Limitations:No    Signed     Cristo Anderson RN    9/20/2019   6:10 PM

## 2019-09-20 NOTE — ED TRIAGE NOTES
Pt reports chest pain and back pain x3 days that is intermittent. Pt reports middle chest pain that radiates tot the right. Pt denies any alleviating or aggravating factors. Pt denies any heavy lifting.

## 2019-09-20 NOTE — ED NOTES
Verbal shift change report given to Kristine Turcios RN (oncoming nurse) by Leo Nuñez RN (offgoing nurse). Report included the following information SBAR.

## 2019-09-20 NOTE — ED PROVIDER NOTES
EMERGENCY DEPARTMENT HISTORY AND PHYSICAL EXAM      Date: 9/20/2019  Patient Name: Amador Joy    Please note that this dictation was completed with [employer], the computer voice recognition software. Quite often unanticipated grammatical, syntax, homophones, and other interpretive errors are inadvertently transcribed by the computer software. Please disregard these errors. Please excuse any errors that have escaped final proofreading. History of Presenting Illness     Chief Complaint   Patient presents with    Chest Pain       History Provided By: Patient    HPI: Amador Joy, 27 y.o. female, seen in the emergency department complaining of chest pain. Pain is described as a pressure, it is intermittent. Radiates through to her back into the right side. No associated shortness of breath, nausea, vomiting, cough, unilateral leg pain or leg swelling. Pain is not pleuritic. Denies any other exacerbating or relieving factors. She has not tried anything at home for. She is been seen in the past for similar symptoms. PCP: Eladio Kawasaki, MD    No current facility-administered medications on file prior to encounter. Current Outpatient Medications on File Prior to Encounter   Medication Sig Dispense Refill    dextroamphetamine-amphetamine (ADDERALL) 10 mg tablet Take 1 Tab by mouth two (2) times a day. Max Daily Amount: 20 mg. 60 Tab 0    buPROPion XL (WELLBUTRIN XL) 150 mg tablet Take 1 Tab by mouth every morning. 30 Tab 2       Past History     Past Medical History:  Past Medical History:   Diagnosis Date    ADHD     Asthma     Depression        Past Surgical History:  History reviewed. No pertinent surgical history. Family History:  History reviewed. No pertinent family history.     Social History:  Social History     Tobacco Use    Smoking status: Never Smoker    Smokeless tobacco: Never Used   Substance Use Topics    Alcohol use: Never     Frequency: Never    Drug use: Never Allergies:  No Known Allergies      Review of Systems   Review of Systems   Constitutional: Negative for chills and fever. HENT: Negative for congestion and sore throat. Eyes: Negative for visual disturbance. Respiratory: Negative for cough and shortness of breath. Cardiovascular: Positive for chest pain. Negative for leg swelling. Gastrointestinal: Negative for abdominal pain, blood in stool, diarrhea and nausea. Endocrine: Negative for polyuria. Genitourinary: Negative for dysuria, flank pain, vaginal bleeding and vaginal discharge. Musculoskeletal: Negative for myalgias. Skin: Negative for rash. Allergic/Immunologic: Negative for immunocompromised state. Neurological: Negative for weakness and headaches. Psychiatric/Behavioral: Negative for confusion. Physical Exam   Physical Exam   Constitutional: She is oriented to person, place, and time. She appears well-developed and well-nourished. HENT:   Head: Normocephalic and atraumatic. Moist mucous membranes   Eyes: Pupils are equal, round, and reactive to light. Conjunctivae are normal. Right eye exhibits no discharge. Left eye exhibits no discharge. Neck: Normal range of motion. Neck supple. No tracheal deviation present. Cardiovascular: Normal rate, regular rhythm and normal heart sounds. No murmur heard. Pulmonary/Chest: Effort normal and breath sounds normal. No respiratory distress. She has no wheezes. She has no rales. Abdominal: Soft. Bowel sounds are normal. There is no tenderness. There is no rebound and no guarding. Musculoskeletal: Normal range of motion. She exhibits no edema, tenderness or deformity. Neurological: She is alert and oriented to person, place, and time. Skin: Skin is warm and dry. No rash noted. No erythema. Psychiatric: Her behavior is normal.   Nursing note and vitals reviewed.       Diagnostic Study Results     Labs -   No results found for this or any previous visit (from the past 12 hour(s)). Radiologic Studies -   XR CHEST PA LAT   Final Result   IMPRESSION: No acute abnormality                    CT Results  (Last 48 hours)    None        CXR Results  (Last 48 hours)               09/20/19 1854  XR CHEST PA LAT Final result    Impression:  IMPRESSION: No acute abnormality                       Narrative:  EXAM:  XR CHEST PA LAT       INDICATION:   chest pain       COMPARISON: None. FINDINGS: PA and lateral radiographs of the chest demonstrate clear lungs. The   cardiac and mediastinal contours and pulmonary vascularity are normal.  The   bones and soft tissues are within normal limits. Medical Decision Making   I am the first provider for this patient. I reviewed the vital signs, available nursing notes, past medical history, past surgical history, family history and social history. Vital Signs-Reviewed the patient's vital signs. No data found. EKG interpretation: (Preliminary)  EKG shows sinus rhythm, rate 91. Normal axis. Normal intervals. No evidence of acute ischemic ST or T wave changes. She has RSR prime pattern. QTc measures 428. Records Reviewed: Nursing Notes and Old Medical Records    Provider Notes (Medical Decision Making):   Doubt ACS, doubt pneumonia, pneumothorax. Will check basic labs, chest x-ray, EKG. Will obtain a d-dimer, low clinical concern for PE, but patient is tachycardic and cannot rule out PE via the [facility] rule. ED Course:   Initial assessment performed. The patients presenting problems have been discussed, and they are in agreement with the care plan formulated and outlined with them. I have encouraged them to ask questions as they arise throughout their visit. Critical Care Time:   none    Disposition:  DISCHARGE NOTE  Patients results have been reviewed with them.   Patient and/or family have verbally conveyed their understanding and agreement of the patient's signs, symptoms, diagnosis, treatment and prognosis and additionally agree to follow up as recommended or return to the Emergency Room should their condition change or have any new concerns prior to their follow-up appointment. Patient verbally agrees with the care-plan and verbally conveys that all of their questions have been answered. Discharge instructions have also been provided to the patient with some educational information regarding their diagnosis as well a list of reasons why they would want to return to the ER prior to their follow-up appointment should their condition change. PLAN:  1. Discharge Medication List as of 9/20/2019  6:54 PM      START taking these medications    Details   famotidine (PEPCID) 20 mg tablet Take 1 Tab by mouth two (2) times a day for 10 days. , Normal, Disp-20 Tab, R-0         CONTINUE these medications which have NOT CHANGED    Details   dextroamphetamine-amphetamine (ADDERALL) 10 mg tablet Take 1 Tab by mouth two (2) times a day. Max Daily Amount: 20 mg., Print, Disp-60 Tab, R-0      buPROPion XL (WELLBUTRIN XL) 150 mg tablet Take 1 Tab by mouth every morning., Normal, Disp-30 Tab, R-2           2. Follow-up Information     Follow up With Specialties Details Why Contact Info    Sadiq Maya MD Internal Medicine Schedule an appointment as soon as possible for a visit  [address]  [address] [address] [phone]      [facility] EMERGENCY DEPT Emergency Medicine  If symptoms worsen Tommy Wolfe  [phone]          Return to ED if worse     Diagnosis     Clinical Impression:   1. Atypical chest pain        Attestations:   This note was completed by Sofia Bonilla DO

## 2019-09-20 NOTE — ED NOTES
Discharge summary and discharge medications reviewed with patient and appropriate educational materials and side effects teaching were provided. patient  Given 0 paper prescriptions and 1 electronic prescriptions sent to pt's listed pharmacy. Patient verbalized understanding of the importance of discussing medications with his or her physician or clinic they will be following up with. No si/s of acute distress prior to discharge. Patient offered wheelchair from treatment area to hospital entrance, patient declined wheelchair. Discharged by provider.

## 2019-09-20 NOTE — ED NOTES
Reports chest/back pressure that occurs every 3 months for about 2 years not associated with injury or exertion. Reports that episodes typically last 3 days. Pt denies taking anything OTC for pain. Pt has been seen before  symptoms but no doctor has found what's wrong. Pt explains that symptoms resolve without treatment.

## 2019-09-22 LAB
ATRIAL RATE: 91 BPM
CALCULATED P AXIS, ECG09: 53 DEGREES
CALCULATED R AXIS, ECG10: 84 DEGREES
CALCULATED T AXIS, ECG11: 59 DEGREES
DIAGNOSIS, 93000: NORMAL
P-R INTERVAL, ECG05: 170 MS
Q-T INTERVAL, ECG07: 348 MS
QRS DURATION, ECG06: 80 MS
QTC CALCULATION (BEZET), ECG08: 428 MS
VENTRICULAR RATE, ECG03: 91 BPM

## 2019-10-28 ENCOUNTER — OFFICE VISIT (OUTPATIENT)
Dept: INTERNAL MEDICINE CLINIC | Age: 24
End: 2019-10-28

## 2019-10-28 VITALS
SYSTOLIC BLOOD PRESSURE: 122 MMHG | TEMPERATURE: 98.4 F | DIASTOLIC BLOOD PRESSURE: 68 MMHG | HEART RATE: 86 BPM | BODY MASS INDEX: 21.73 KG/M2 | OXYGEN SATURATION: 96 % | HEIGHT: 59 IN | RESPIRATION RATE: 14 BRPM | WEIGHT: 107.8 LBS

## 2019-10-28 DIAGNOSIS — F32.1 MAJOR DEPRESSIVE DISORDER, SINGLE EPISODE, MODERATE WITH ANXIOUS DISTRESS (HCC): Primary | ICD-10-CM

## 2019-10-28 DIAGNOSIS — F90.9 ATTENTION DEFICIT HYPERACTIVITY DISORDER (ADHD), UNSPECIFIED ADHD TYPE: ICD-10-CM

## 2019-10-28 DIAGNOSIS — Z23 ENCOUNTER FOR IMMUNIZATION: ICD-10-CM

## 2019-10-28 DIAGNOSIS — E55.9 VITAMIN D DEFICIENCY: ICD-10-CM

## 2019-10-28 DIAGNOSIS — Z00.00 ROUTINE GENERAL MEDICAL EXAMINATION AT A HEALTH CARE FACILITY: ICD-10-CM

## 2019-10-28 DIAGNOSIS — R53.83 FATIGUE, UNSPECIFIED TYPE: ICD-10-CM

## 2019-10-28 NOTE — PROGRESS NOTES
Health Maintenance Due   Topic Date Due    HPV Age 9Y-34Y (1 - Female 3-dose series) 05/01/2012    DTaP/Tdap/Td series (1 - Tdap) 05/01/2018    PAP AKA CERVICAL CYTOLOGY  05/01/2018    Influenza Age 5 to Adult  08/01/2019       Chief Complaint   Patient presents with    Complete Physical     New pt; establish care       1. Have you been to the ER, urgent care clinic since your last visit? Hospitalized since your last visit? No    2. Have you seen or consulted any other health care providers outside of the [address] [place] [address] since your last visit? Include any pap smears or colon screening. No    3) Do you have an Advance Directive on file? no    4) Are you interested in receiving information on Advance Directives? NO      Patient is accompanied by self I have received verbal consent from Karyle Seeds to discuss any/all medical information while they are present in the room. Karyle Seeds is a 27 y.o. female  who presents for routine immunization(s) Fluarix Quadrivalent. Patient denies any symptoms , reactions or allergies that would exclude them from being immunized today. Risks and adverse reactions were discussed and the VIS was given to them. Patient voiced full understanding and signed Adult Immunization Consent form. All questions were addressed. Patient was observed for 10 min post injection. There were no reactions observed.     Masoud Olivia LPN

## 2019-10-28 NOTE — PROGRESS NOTES
HISTORY OF PRESENT ILLNESS  Monster Lazaro is a 27 y.o. female here to establish care. She is diagnosed with major depression and ADHD. Seeing psychiatrist Dr. Alannah Santos is helping her a lot. She is comfortable. Feeling tired or a lot. Need lab work. Here for complete physical.  Need lab work. Need flu shot. HPI    Review of Systems   Constitutional: Negative. HENT: Negative. Eyes: Negative. Respiratory: Negative. Cardiovascular: Negative. Gastrointestinal: Negative. Genitourinary: Negative. Musculoskeletal: Negative. Skin: Negative. Neurological: Negative. Endo/Heme/Allergies: Negative. Psychiatric/Behavioral: Negative. Physical Exam   Constitutional: She is oriented to person, place, and time. She appears well-developed and well-nourished. No distress. HENT:   Head: Normocephalic and atraumatic. Right Ear: External ear normal.   Left Ear: External ear normal.   Nose: Nose normal.   Mouth/Throat: Oropharynx is clear and moist. No oropharyngeal exudate. Eyes: Pupils are equal, round, and reactive to light. Conjunctivae and EOM are normal.   Neck: Normal range of motion. Neck supple. No tracheal deviation present. No thyromegaly present. Cardiovascular: Normal rate, regular rhythm, normal heart sounds and intact distal pulses. Pulmonary/Chest: Effort normal and breath sounds normal. No respiratory distress. She has no wheezes. Abdominal: Soft. Bowel sounds are normal. She exhibits no distension. There is no tenderness. Musculoskeletal: She exhibits no edema or tenderness. Neurological: She is alert and oriented to person, place, and time. She has normal reflexes. She displays normal reflexes. No cranial nerve deficit. She exhibits normal muscle tone. Coordination normal.   Psychiatric: She has a normal mood and affect. Her behavior is normal.       ASSESSMENT and PLAN  Diagnoses and all orders for this visit:    1.  Major depressive disorder, single episode, moderate with anxious distress (HCC)    Stable depression. On Wellbutrin XL. Seeing Dr. Dc Dumont.  -     CBC W/O DIFF  -     METABOLIC PANEL, COMPREHENSIVE    2. Routine general medical examination at a health care facility    Seems healthy. Advised to eat healthy and exercise. Will check,  -     CBC W/O DIFF  -     METABOLIC PANEL, COMPREHENSIVE  -     LIPID PANEL  -     TSH 3RD GENERATION  -     URINALYSIS W/ RFLX MICROSCOPIC    3. Attention deficit hyperactivity disorder (ADHD), unspecified ADHD type  On Adderall twice a day. Doing well. 4. Vitamin D deficiency  We will recheck,  -     VITAMIN D, 25 HYDROXY    5. Fatigue, unspecified type    Will check,  -     CBC W/O DIFF  -     METABOLIC PANEL, COMPREHENSIVE  -     TSH 3RD GENERATION    6. Encounter for immunization    Will give,  -     INFLUENZA VIRUS VAC QUAD,SPLIT,PRESV FREE SYRINGE IM    Discussed expected course/resolution/complications of diagnosis in detail with patient. Medication risks/benefits/costs/interactions/alternatives discussed with patient. Pt was given an after visit summary which includes diagnoses, current medications & vitals. Pt expressed understanding with the diagnosis and plan.

## 2019-10-29 LAB
25(OH)D3+25(OH)D2 SERPL-MCNC: 16.5 NG/ML (ref 30–100)
ALBUMIN SERPL-MCNC: 4.7 G/DL (ref 3.5–5.5)
ALBUMIN/GLOB SERPL: 1.6 {RATIO} (ref 1.2–2.2)
ALP SERPL-CCNC: 48 IU/L (ref 39–117)
ALT SERPL-CCNC: 9 IU/L (ref 0–32)
APPEARANCE UR: CLEAR
AST SERPL-CCNC: 17 IU/L (ref 0–40)
BACTERIA #/AREA URNS HPF: ABNORMAL /[HPF]
BILIRUB SERPL-MCNC: 0.3 MG/DL (ref 0–1.2)
BILIRUB UR QL STRIP: NEGATIVE
BUN SERPL-MCNC: 13 MG/DL (ref 6–20)
BUN/CREAT SERPL: 16 (ref 9–23)
CALCIUM SERPL-MCNC: 9.7 MG/DL (ref 8.7–10.2)
CASTS URNS QL MICRO: ABNORMAL /LPF
CHLORIDE SERPL-SCNC: 102 MMOL/L (ref 96–106)
CHOLEST SERPL-MCNC: 182 MG/DL (ref 100–199)
CO2 SERPL-SCNC: 23 MMOL/L (ref 20–29)
COLOR UR: YELLOW
CREAT SERPL-MCNC: 0.82 MG/DL (ref 0.57–1)
EPI CELLS #/AREA URNS HPF: >10 /HPF (ref 0–10)
ERYTHROCYTE [DISTWIDTH] IN BLOOD BY AUTOMATED COUNT: 11.9 % (ref 12.3–15.4)
GLOBULIN SER CALC-MCNC: 2.9 G/DL (ref 1.5–4.5)
GLUCOSE SERPL-MCNC: 85 MG/DL (ref 65–99)
GLUCOSE UR QL: NEGATIVE
HCT VFR BLD AUTO: 37.7 % (ref 34–46.6)
HDLC SERPL-MCNC: 57 MG/DL
HGB BLD-MCNC: 12.3 G/DL (ref 11.1–15.9)
HGB UR QL STRIP: ABNORMAL
INTERPRETATION, 910389: NORMAL
KETONES UR QL STRIP: NEGATIVE
LDLC SERPL CALC-MCNC: 115 MG/DL (ref 0–99)
LEUKOCYTE ESTERASE UR QL STRIP: NEGATIVE
MCH RBC QN AUTO: 28.7 PG (ref 26.6–33)
MCHC RBC AUTO-ENTMCNC: 32.6 G/DL (ref 31.5–35.7)
MCV RBC AUTO: 88 FL (ref 79–97)
MICRO URNS: ABNORMAL
MUCOUS THREADS URNS QL MICRO: PRESENT
NITRITE UR QL STRIP: NEGATIVE
PH UR STRIP: 5.5 [PH] (ref 5–7.5)
PLATELET # BLD AUTO: 275 X10E3/UL (ref 150–450)
POTASSIUM SERPL-SCNC: 3.8 MMOL/L (ref 3.5–5.2)
PROT SERPL-MCNC: 7.6 G/DL (ref 6–8.5)
PROT UR QL STRIP: NEGATIVE
RBC # BLD AUTO: 4.28 X10E6/UL (ref 3.77–5.28)
RBC #/AREA URNS HPF: ABNORMAL /HPF (ref 0–2)
SODIUM SERPL-SCNC: 141 MMOL/L (ref 134–144)
SP GR UR: 1.02 (ref 1–1.03)
TRIGL SERPL-MCNC: 52 MG/DL (ref 0–149)
TSH SERPL DL<=0.005 MIU/L-ACNC: 1.51 UIU/ML (ref 0.45–4.5)
UROBILINOGEN UR STRIP-MCNC: 0.2 MG/DL (ref 0.2–1)
VLDLC SERPL CALC-MCNC: 10 MG/DL (ref 5–40)
WBC # BLD AUTO: 5.1 X10E3/UL (ref 3.4–10.8)
WBC #/AREA URNS HPF: ABNORMAL /HPF (ref 0–5)

## 2019-10-31 DIAGNOSIS — R06.2 WHEEZING: Primary | ICD-10-CM

## 2019-10-31 DIAGNOSIS — E55.9 VITAMIN D DEFICIENCY: ICD-10-CM

## 2019-10-31 RX ORDER — ERGOCALCIFEROL 1.25 MG/1
50000 CAPSULE ORAL
Qty: 4 CAP | Refills: 3 | Status: SHIPPED | OUTPATIENT
Start: 2019-10-31 | End: 2020-01-09 | Stop reason: ALTCHOICE

## 2019-10-31 RX ORDER — ALBUTEROL SULFATE 90 UG/1
1 AEROSOL, METERED RESPIRATORY (INHALATION)
Qty: 1 INHALER | Refills: 0 | Status: SHIPPED | OUTPATIENT
Start: 2019-10-31 | End: 2020-03-04

## 2019-11-04 NOTE — PROGRESS NOTES
vit D level very low.will start on vit D 50,000 unit 1 cap weekly for 4 months. will repeat level in 4 months. adv to be on milk product and expose to sun for 20 min a day. LDL slightly high, advised to take the low-cholesterol diet and exercise. Blood in urine, probably patient was menstruating. If not, repeat UA in a month.

## 2019-11-07 ENCOUNTER — TELEPHONE (OUTPATIENT)
Dept: INTERNAL MEDICINE CLINIC | Age: 24
End: 2019-11-07

## 2019-11-09 DIAGNOSIS — R31.9 HEMATURIA, UNSPECIFIED TYPE: Primary | ICD-10-CM

## 2019-11-21 DIAGNOSIS — Z86.59 HISTORY OF ATTENTION DEFICIT HYPERACTIVITY DISORDER (ADHD): ICD-10-CM

## 2019-11-22 RX ORDER — DEXTROAMPHETAMINE SACCHARATE, AMPHETAMINE ASPARTATE, DEXTROAMPHETAMINE SULFATE AND AMPHETAMINE SULFATE 2.5; 2.5; 2.5; 2.5 MG/1; MG/1; MG/1; MG/1
10 TABLET ORAL 2 TIMES DAILY
Qty: 60 TAB | Refills: 0 | Status: SHIPPED | OUTPATIENT
Start: 2019-11-22 | End: 2020-04-18 | Stop reason: SDUPTHER

## 2019-12-02 ENCOUNTER — HOSPITAL ENCOUNTER (EMERGENCY)
Age: 24
Discharge: HOME | End: 2019-12-02
Attending: EMERGENCY MEDICINE
Payer: MEDICAID

## 2019-12-02 VITALS
OXYGEN SATURATION: 99 % | RESPIRATION RATE: 18 BRPM | TEMPERATURE: 98.7 F | HEART RATE: 78 BPM | HEIGHT: 59 IN | DIASTOLIC BLOOD PRESSURE: 79 MMHG | BODY MASS INDEX: 21.77 KG/M2 | WEIGHT: 108 LBS | SYSTOLIC BLOOD PRESSURE: 127 MMHG

## 2019-12-02 DIAGNOSIS — N94.0 MITTELSCHMERZ: Primary | ICD-10-CM

## 2019-12-02 LAB
APPEARANCE UR: CLEAR
BACTERIA URNS QL MICRO: ABNORMAL /HPF
BILIRUB UR QL: NEGATIVE
COLOR UR: ABNORMAL
EPITH CASTS URNS QL MICRO: ABNORMAL /LPF
GLUCOSE UR STRIP.AUTO-MCNC: NEGATIVE MG/DL
HCG UR QL: NEGATIVE
HGB UR QL STRIP: ABNORMAL
KETONES UR QL STRIP.AUTO: NEGATIVE MG/DL
LEUKOCYTE ESTERASE UR QL STRIP.AUTO: ABNORMAL
NITRITE UR QL STRIP.AUTO: NEGATIVE
PH UR STRIP: 6 [PH] (ref 5–8)
PROT UR STRIP-MCNC: NEGATIVE MG/DL
RBC #/AREA URNS HPF: ABNORMAL /HPF (ref 0–5)
SP GR UR REFRACTOMETRY: 1.02 (ref 1–1.03)
UROBILINOGEN UR QL STRIP.AUTO: 0.2 EU/DL (ref 0.2–1)
WBC URNS QL MICRO: ABNORMAL /HPF (ref 0–4)

## 2019-12-02 PROCEDURE — 81001 URINALYSIS AUTO W/SCOPE: CPT

## 2019-12-02 PROCEDURE — 74011250637 HC RX REV CODE- 250/637: Performed by: PHYSICIAN ASSISTANT

## 2019-12-02 PROCEDURE — 99283 EMERGENCY DEPT VISIT LOW MDM: CPT

## 2019-12-02 PROCEDURE — 81025 URINE PREGNANCY TEST: CPT

## 2019-12-02 RX ORDER — IBUPROFEN 600 MG/1
600 TABLET ORAL
Status: COMPLETED | OUTPATIENT
Start: 2019-12-02 | End: 2019-12-02

## 2019-12-02 RX ORDER — IBUPROFEN 600 MG/1
600 TABLET ORAL
Qty: 20 TAB | Refills: 0 | Status: SHIPPED | OUTPATIENT
Start: 2019-12-02 | End: 2020-01-09 | Stop reason: ALTCHOICE

## 2019-12-02 RX ADMIN — IBUPROFEN 600 MG: 600 TABLET, FILM COATED ORAL at 17:51

## 2019-12-02 NOTE — LETTER
[facility] EMERGENCY DEPT 
[address] [facility] [place] [address] 
[phone] Work/School Note Date: 12/2/2019 To Whom It May concern: 
 
Mi Martinez was seen and treated today in the emergency room by the following provider(s): 
Attending Provider: Rosa Elena Wilde MD 
Physician Assistant: Juana Callejas PA-C. Mi Martinez may return to work on 12/4/19. Sincerely, Kam Shankar PA-C

## 2019-12-02 NOTE — ED PROVIDER NOTES
EMERGENCY DEPARTMENT HISTORY AND PHYSICAL EXAM      Date: 12/2/2019  Patient Name: Nazanin Tinajero    History of Presenting Illness     Chief Complaint   Patient presents with    Abdominal Pain       History Provided By: Patient    HPI: Nazanin Tinajero, 27 y.o. female with PMHx significant for anxiety, depression, asthma. Patient presents to the emergency department with complaints of lower pelvic pain. She states that has been approximately 2 weeks exactly since her last menstrual cycle and she took ibuprofen has helped bring her pain down somewhat. She states that the pain started yesterday and has been on and off and she denies any nausea, vomiting, burning urination, vaginal discharge or any other acute complaints at this time besides the pelvic pain that she rates a 7 out of 10 cramping sensation. Please note for examination and HPI were completed I did introduce myself as the physician assistant. Please note that this dictation was completed with [employer], the [employer] voice recognition software. Quite often unanticipated grammatical, syntax, homophones, and other interpretive errors are inadvertently transcribed by the computer software. Please disregard these errors. Please excuse any errors that have escaped final proofreading. For further clarification on any chart please contact myself. Thank you. There are no other complaints, changes, or physical findings at this time. PCP: Catarino Lindsay MD    No current facility-administered medications on file prior to encounter. Current Outpatient Medications on File Prior to Encounter   Medication Sig Dispense Refill    dextroamphetamine-amphetamine (ADDERALL) 10 mg tablet Take 1 Tab by mouth two (2) times a day. Max Daily Amount: 20 mg. 60 Tab 0    albuterol (PROVENTIL HFA, VENTOLIN HFA, PROAIR HFA) 90 mcg/actuation inhaler Take 1 Puff by inhalation every six (6) hours as needed for Wheezing.  1 Inhaler 0    ergocalciferol (ERGOCALCIFEROL) 50,000 unit capsule Take 1 Cap by mouth every seven (7) days. 4 Cap 3    buPROPion XL (WELLBUTRIN XL) 150 mg tablet Take 1 Tab by mouth every morning. 30 Tab 2       Past History     Past Medical History:  Past Medical History:   Diagnosis Date    ADHD     Asthma     Depression     Headache     Psychotic disorder ([address]        Past Surgical History:  No past surgical history on file. Family History:  Family History   Problem Relation Age of Onset    No Known Problems Mother     Cancer Father         brain ca    Lung Disease Father         COPD       Social History:  Social History     Tobacco Use    Smoking status: Never Smoker    Smokeless tobacco: Never Used   Substance Use Topics    Alcohol use: Never     Frequency: Never    Drug use: Never       Allergies:  No Known Allergies      Review of Systems   Review of Systems   Genitourinary: Positive for pelvic pain. Physical Exam   Physical Exam  Constitutional:       Appearance: She is well-developed. HENT:      Head: Normocephalic and atraumatic. Eyes:      Conjunctiva/sclera: Conjunctivae normal.      Pupils: Pupils are equal, round, and reactive to light. Neck:      Musculoskeletal: Normal range of motion and neck supple. Thyroid: No thyromegaly. Cardiovascular:      Rate and Rhythm: Normal rate and regular rhythm. Heart sounds: Normal heart sounds. No murmur. Pulmonary:      Effort: Pulmonary effort is normal. No respiratory distress. Breath sounds: Normal breath sounds. No stridor. No wheezing. Abdominal:      General: Bowel sounds are normal.      Palpations: Abdomen is soft. Tenderness: There is no tenderness. Musculoskeletal: Normal range of motion. General: No tenderness. Lymphadenopathy:      Cervical: No cervical adenopathy. Skin:     General: Skin is warm. Neurological:      Mental Status: She is alert and oriented to person, place, and time.       Deep Tendon Reflexes: Reflexes are normal and symmetric. Psychiatric:         Judgment: Judgment normal.         Diagnostic Study Results     Labs -     Recent Results (from the past 12 hour(s))   URINALYSIS W/ RFLX MICROSCOPIC    Collection Time: 12/02/19  5:32 PM   Result Value Ref Range    Color YELLOW/STRAW      Appearance CLEAR CLEAR      Specific gravity 1.020 1.003 - 1.030      pH (UA) 6.0 5.0 - 8.0      Protein NEGATIVE  NEG mg/dL    Glucose NEGATIVE  NEG mg/dL    Ketone NEGATIVE  NEG mg/dL    Bilirubin NEGATIVE  NEG      Blood SMALL (A) NEG      Urobilinogen 0.2 0.2 - 1.0 EU/dL    Nitrites NEGATIVE  NEG      Leukocyte Esterase SMALL (A) NEG     URINE MICROSCOPIC ONLY    Collection Time: 12/02/19  5:32 PM   Result Value Ref Range    WBC 5-10 0 - 4 /hpf    RBC 0-5 0 - 5 /hpf    Epithelial cells MODERATE (A) FEW /lpf    Bacteria 1+ (A) NEG /hpf   HCG URINE, QL. - POC    Collection Time: 12/02/19  5:34 PM   Result Value Ref Range    Pregnancy test,urine (POC) NEGATIVE  NEG         Radiologic Studies -   No orders to display     CT Results  (Last 48 hours)    None        CXR Results  (Last 48 hours)    None            Medical Decision Making   I am the first provider for this patient. I reviewed the vital signs, available nursing notes, past medical history, past surgical history, family history and social history. Vital Signs-Reviewed the patient's vital signs. Patient Vitals for the past 12 hrs:   Temp Pulse Resp BP SpO2   12/02/19 1647 98.7 °F (37.1 °C) 78 18 127/79 99 %       Records Reviewed: Nursing Notes    Provider Notes (Medical Decision Making): At this time urinalysis is completely within normal limits. Patient does not have any abdominal tenderness appreciated on physical examination. Denies any fevers, intractable vomiting episodes or any other acute complaints.   At this time did give patient ibuprofen in which she had great reduction of her pain and I did advise her to follow-up with her gynecologist for further evaluation. At this time it does seem like this is more mittelschmerz versus any other recurrence at this time and we will go ahead and discharge patient stable condition to follow-up on outpatient basis with primary care specialist.  Patient voices her understanding agreement to treatment plan has declined any pelvic examination, transvaginal ultrasound but does agree to return for any new or worsening complications    ED Course:   Initial assessment performed. The patients presenting problems have been discussed, and they are in agreement with the care plan formulated and outlined with them. I have encouraged them to ask questions as they arise throughout their visit. Critical Care Time: None    Disposition:  Disposition for home    PLAN:  1. Current Discharge Medication List      START taking these medications    Details   ibuprofen (MOTRIN) 600 mg tablet Take 1 Tab by mouth every eight (8) hours as needed for Pain. Qty: 20 Tab, Refills: 0         CONTINUE these medications which have NOT CHANGED    Details   dextroamphetamine-amphetamine (ADDERALL) 10 mg tablet Take 1 Tab by mouth two (2) times a day. Max Daily Amount: 20 mg.  Qty: 60 Tab, Refills: 0    Associated Diagnoses: History of attention deficit hyperactivity disorder (ADHD)      albuterol (PROVENTIL HFA, VENTOLIN HFA, PROAIR HFA) 90 mcg/actuation inhaler Take 1 Puff by inhalation every six (6) hours as needed for Wheezing. Qty: 1 Inhaler, Refills: 0    Associated Diagnoses: Wheezing      ergocalciferol (ERGOCALCIFEROL) 50,000 unit capsule Take 1 Cap by mouth every seven (7) days. Qty: 4 Cap, Refills: 3    Associated Diagnoses: Vitamin D deficiency      buPROPion XL (WELLBUTRIN XL) 150 mg tablet Take 1 Tab by mouth every morning. Qty: 30 Tab, Refills: 2    Associated Diagnoses: Major depressive disorder, single episode, moderate with anxious distress ([address]           2.    Follow-up Information     Follow up With Specialties Details Why Contact Info    Lola Bella MD Internal Medicine   [id number]  [address]  [phone]          Return to ED if worse     Diagnosis     Clinical Impression:   1. Christian          Please note that this dictation was completed with [email or web address], the computer voice recognition software. Quite often unanticipated grammatical, syntax, homophones, and other interpretive errors are inadvertently transcribed by the computer software. Please disregards these errors. Please excuse any errors that have escaped final proofreading. This note will not be viewable in [address]

## 2019-12-02 NOTE — DISCHARGE INSTRUCTIONS
Patient Education        Learning About Mittelschmerz (Pain During Ovulation)  What is mittelschmerz? Mittelschmerz is pain a woman might feel on one side of the belly once a month while she is ovulating. The pain is usually mild. It's a sign that she's released an egg from one of her ovaries. A woman is most fertile--and more likely to get pregnant--when she ovulates. Mittelschmerz can be used to plan when to have sex to try to become pregnant. But using mittelschmerz to time sex to avoid pregnancy usually doesn't work. What happens when you have mittelschmerz? When you ovulate, a mature egg is released from one of your ovaries. Two things happen naturally at this time that may cause the pain:  · The small sac (follicle) on the ovary where the egg matures grows rapidly and stretches the surface of your ovary. · When the follicle squeezes the egg out, the blood or fluid in the follicle may irritate the tissues around it. What are the symptoms? · The pain is usually mild, like a sharp cramp on one side of your belly. It may shift from side to side from one menstrual cycle to the next. Or it may stay on the same side for more than one cycle. · The pain lasts from a few minutes to a few hours. It may last up to 24 to 48 hours. How is mittelschmerz treated? · Take an over-the-counter pain medicine, such as acetaminophen (Tylenol), ibuprofen (Advil, Motrin), or naproxen (Aleve) if needed. Read and follow all instructions on the label. Do not take two or more pain medicines at the same time unless the doctor told you to. Many pain medicines have acetaminophen, which is Tylenol. Too much acetaminophen (Tylenol) can be harmful. · If you are trying to get pregnant, talk with your doctor about which pain medicine is safest in early pregnancy. When should you call for help? Call your doctor now or seek immediate medical care if:  · You have severe vaginal bleeding.   · You have new or worse belly or pelvic pain.  Watch closely for changes in your health, and be sure to contact your doctor if:  · You have unusual vaginal bleeding. · You do not get better as expected. Follow-up care is a key part of your treatment and safety. Be sure to make and go to all appointments, and call your doctor if you are having problems. It's also a good idea to know your test results and keep a list of the medicines you take. Where can you learn more? Go to http://olga-rico.info/. Enter C670 in the search box to learn more about \"Learning About Christian (Pain During Ovulation). \"  Current as of: February 19, 2019  Content Version: 12.2  © 4589-3659 [employer]. Care instructions adapted under license by [employer] (which disclaims liability or warranty for this information). If you have questions about a medical condition or this instruction, always ask your healthcare professional. [address] any warranty or liability for your use of this information.

## 2019-12-09 DIAGNOSIS — R31.9 HEMATURIA, UNSPECIFIED TYPE: ICD-10-CM

## 2019-12-18 ENCOUNTER — OFFICE VISIT (OUTPATIENT)
Dept: BEHAVIORAL/MENTAL HEALTH CLINIC | Age: 24
End: 2019-12-18

## 2019-12-18 VITALS
WEIGHT: 109 LBS | BODY MASS INDEX: 21.97 KG/M2 | DIASTOLIC BLOOD PRESSURE: 84 MMHG | HEART RATE: 99 BPM | SYSTOLIC BLOOD PRESSURE: 140 MMHG | HEIGHT: 59 IN

## 2019-12-18 DIAGNOSIS — F32.1 MAJOR DEPRESSIVE DISORDER, SINGLE EPISODE, MODERATE WITH ANXIOUS DISTRESS (HCC): ICD-10-CM

## 2019-12-18 DIAGNOSIS — Z86.59 HISTORY OF ATTENTION DEFICIT HYPERACTIVITY DISORDER (ADHD): Primary | ICD-10-CM

## 2019-12-18 NOTE — PROGRESS NOTES
Psychiatric Outpatient Progress Note    Account Number:  [de-identified]  Name: Deandra Dugan    SUBJECTIVE:     CHIEF COMPLAINT:    HPI:  Deandra Dugan is a 27 y.o. female and was seen today for follow-up of psychiatric condition and psychotropic medication management. Miss Catalina Kearns is a 26 y/o single AA female , a sophomore at [employer] who presents to establish care at this clinic. She has been escorted by her mother, Nora Bernal. She has been struggling with symptoms of anxious depression for the past few months since her grades soared downwards where she could not complete her tasks, home works,projects and her GPA declined. She received a letter that her financial aid would be cut off unless she raised her grades this semester. IN addition her mother, Varsha Sawant reports that her daughter remains isolative, lacking a social [place] of friends, being home bound. She indicated problems in school for which she was evaluated by [facility] and treated with Adderall which she did not like, \" don't like to take medications/drugs\". Further exploration indicated a lack of interest in sex, men, relationships, but more concerned about her future career,etc. She denies any suicidal thoughts but endorsed them on the screening form because she has  had fleeting thoughts when in doubt of succeeding in school and achieving her goals of becoming a .      She has been isolative, withdrawn, sleeping excessively (7am-7pm),sleep pattern is erratic, lacks appetite, does not eat much. She was clearly anhedonic and apathetic  for a 26 y/o. She only smiled once.      Current symptoms: depressed, anxious, tense,irritable, insomnia,concentration deficits,anhedonia,fatigue,appetite ,feeling bad about self,fidgety,death wishes,distractible,racing thoughts,     Duration of symptoms: for past few months        PHQ-9 scores:21/27  HAM-A scores:  Mood Disorder Questionaire scores:3/13        STRESSORS/ Precipitating factors:  School grades,uncertain future     PERSONAL COPING STYLEs :  Has been explosive in the past, breaking things ( per mom), go to sleep, listen to music            REVIEW OF  PSYCHIATRIC SYSTEMS:  Patient did not meet criteria for a Major Depressive Disorder ( W OR W/O  psychotic features) PTSD, Schizophrenia, Bipolar Affective Disorder, Obsessive Compulsive Disorder, Anxiety Disorder, Agoraphobia, EATING DISORDER,SUBSTANCE USE DISORDER,  Psychotic disorders or ASD.        PAST PSYCHIATRIC HISTORY:   Outpatient Psychiatric treatments:Treated for ADHD at age 11 (after father's death),[facility],saw  in , but he left. Suicide attempts/self inflictive behaviors:none   Hospitalizations:  none  Medications Tried: Adderall   ECT:   None   Legal/Assault History:  None      PAST SUBSTANCE ABUSE HISTORY:  Denies any drug use, detests alcohol, ( avoids them since her mother was an alcoholic)     FAMILY PSYCH HISTORY: mother and grandmother suffer from depression, father also had alcohol/drug problems     SOCIAL HISTORY: She is the youngest of 4 girls born to a single mother,father  when she was 11, and she lived with her paternal grandmother , then with her stepmother, then with half sister (paternal) ,mother did not have custody due to her drug use. Stayed with mother for the rest of that time. Denies any sexual and physical abuse. Repeated 7th grade. Did graduate from [employer]. Currently in college.    Worked for [employer] ( 4months),  Seasonal jobs with [employer], [address] [place] [address]     PAST MEDICAL/SURGICAL HISTORY:  Unremarkable  Other than asthma    ----------------------------------------------------------------------------------------------------------------------------------------------------------------------------------------------------------------------------------------------------------------------------------------------------------------------------------------------------------------------------------------------------------------------------------------------------------------------------------------------------------  Course of events since last visit: Miss Pruitt Scales returns for her scheduled appointment. She  was placed on Adderall 10mg of ADHD and Wellbutrin for depression. She  reports that after taking Wellbutrin for one month she began to feel worse and had suicidal thoughts. So she stopped the medication completely. She feels overall better at this time, getting along with her mother but is requesting a therapist to work with the family. She did well in school and is not taking the Adderall at this time because \" school is out\". She is working daily at [employer] and enjoying it. She is going out with friends and overall making progress. Fam/Social STATUS  OR changes: none,      Side Effects:  more depressed with suicidal thoughts      REVIEW OF SYSTEMS:  Pertinent items are noted in HPI/above.     Visit Vitals  /84 (BP 1 Location: Left arm, BP Patient Position: Sitting)   Pulse 99   Ht 4' 11\" (1.499 m)   Wt 49.4 kg (109 lb)   BMI 22.02 kg/m²       OBJECTIVE:                 Mental Status exam: WNL except for      Attitude/Behavior  cooperative,     Eye Contact    appropriate   Appearance:  age appropriate and well dressed   Motor Behavior/Gait:  within normal limits   Speech:  normal pitch, normal volume and non-pressured   Thought Process: goal directed and within normal limits linear   Thought Content free of delusions and obsessions   Perceptual distortions  Patient denied any visual,auditory,olfactory or gustatory hallucinations. No illusions were reported or noted eithter   Suicidal ideations no plan  and no intention   Homicidal ideations no plan  and no intention   Mood:  euthymic   Affect:  stable     Orientation/sensorium  Alert and oriented to  date,place, situation and persons   Memory recent  adequate   Memory remote:  adequate   Concentration:  adequate   Abstraction:  intact   Judgment &Insight:  good   Reliability good           MEDICAL DECISION MAKING:     Data REVIEWED: pertinent labs, imaging, medical records and diagnostic tests reviewed and incorporated in diagnosis and treatment plan    No Known Allergies     Current Outpatient Medications   Medication Sig Dispense Refill    ibuprofen (MOTRIN) 600 mg tablet Take 1 Tab by mouth every eight (8) hours as needed for Pain. 20 Tab 0    dextroamphetamine-amphetamine (ADDERALL) 10 mg tablet Take 1 Tab by mouth two (2) times a day. Max Daily Amount: 20 mg. 60 Tab 0    albuterol (PROVENTIL HFA, VENTOLIN HFA, PROAIR HFA) 90 mcg/actuation inhaler Take 1 Puff by inhalation every six (6) hours as needed for Wheezing. 1 Inhaler 0    ergocalciferol (ERGOCALCIFEROL) 50,000 unit capsule Take 1 Cap by mouth every seven (7) days. 4 Cap 3          ICD-10-CM ICD-9-CM    1. History of attention deficit hyperactivity disorder (ADHD) Z86.59 V11.8    2. Major depressive disorder, single episode, moderate with anxious distress (HCC) F32.1 296.22        No orders of the defined types were placed in this encounter. Assessment:   Silvana Escamilla  is a 27 y.o.  female  is  responding to treatment. Symptoms have subsided for now. She  Would like family therapy and return in two months when school is in function to reassess her stress/emotional level      Patient denies SI/HI/SIB  SUICIDE RISK:low      Treatment Plan  Treatment plan reviewed with patient-including diagnosis and medications:    1.   Medication Changes/Adjustments: none, she has ample supply of Adderall for now and does not want the Wellbutrin. Current Outpatient Medications   Medication Sig Dispense Refill    ibuprofen (MOTRIN) 600 mg tablet Take 1 Tab by mouth every eight (8) hours as needed for Pain. 20 Tab 0    dextroamphetamine-amphetamine (ADDERALL) 10 mg tablet Take 1 Tab by mouth two (2) times a day. Max Daily Amount: 20 mg. 60 Tab 0    albuterol (PROVENTIL HFA, VENTOLIN HFA, PROAIR HFA) 90 mcg/actuation inhaler Take 1 Puff by inhalation every six (6) hours as needed for Wheezing. 1 Inhaler 0    ergocalciferol (ERGOCALCIFEROL) 50,000 unit capsule Take 1 Cap by mouth every seven (7) days. 4 Cap 3             The risks, benefits of the proposed medication and the potential medication side effects (ie dry mouth, weight gain, GI upset, headache,tremors, extrapyramidal side effects, sexual dysfunction, suicidal thoughts,sweating) etc.were discussed . The potential for dependence and addiction discussed. The patient was given the opportunity to ask questions. The patient was informed of the consequences of refusing medications and non-compliance. Patient agreed with this plan. PSYCHOTHERAPY:  approx 15 minutes  Supportive/Cognitive Behavioral/Solution Focused psychotherapy provided  Discussed rational versus irrational thinking patterns and their consequences. Discussed healthy/adaptive and unhealthy/maladaptive coping. Homework given regarding:   Psycho-education/ handouts provided:  none    LABORATORY ORDERS & OR REFERRALS:     Patient instructed to call or e-mail to [facility] with any side effects, questions or issues. Ms. Edmond Bhakta has a reminder for a \"due or due soon\" health maintenance. I have asked that she contact her primary care provider for follow-up on this health maintenance. Roldan Nguyen is progressing/stable. Follow-up and Dispositions    · Return in about 2 months (around 2/18/2020).            Stephon Rodríguez MD  12/18/2019      TIME SPENT FACE TO FACE WITH THE PATIENT: 21 MINUTES    There are other unrelated non-urgent complaints, but due to the busy schedule and the amount of time I've already spent with her, time does not permit me to address these routine issues at today's visit. I've requested another appointment to review these additional issues.

## 2019-12-20 LAB
APPEARANCE UR: CLEAR
BACTERIA #/AREA URNS HPF: ABNORMAL /[HPF]
BILIRUB UR QL STRIP: NEGATIVE
CASTS URNS QL MICRO: ABNORMAL /LPF
COLOR UR: YELLOW
EPI CELLS #/AREA URNS HPF: >10 /HPF (ref 0–10)
GLUCOSE UR QL: NEGATIVE
HGB UR QL STRIP: NEGATIVE
KETONES UR QL STRIP: NEGATIVE
LEUKOCYTE ESTERASE UR QL STRIP: ABNORMAL
MICRO URNS: ABNORMAL
MUCOUS THREADS URNS QL MICRO: PRESENT
NITRITE UR QL STRIP: NEGATIVE
PH UR STRIP: 7 [PH] (ref 5–7.5)
PROT UR QL STRIP: NEGATIVE
RBC #/AREA URNS HPF: ABNORMAL /HPF (ref 0–2)
SP GR UR: 1.01 (ref 1–1.03)
UROBILINOGEN UR STRIP-MCNC: 0.2 MG/DL (ref 0.2–1)
WBC #/AREA URNS HPF: ABNORMAL /HPF (ref 0–5)

## 2020-01-09 ENCOUNTER — OFFICE VISIT (OUTPATIENT)
Dept: INTERNAL MEDICINE CLINIC | Age: 25
End: 2020-01-09

## 2020-01-09 VITALS
SYSTOLIC BLOOD PRESSURE: 112 MMHG | WEIGHT: 108.8 LBS | BODY MASS INDEX: 21.93 KG/M2 | HEIGHT: 59 IN | RESPIRATION RATE: 15 BRPM | HEART RATE: 68 BPM | TEMPERATURE: 98 F | OXYGEN SATURATION: 98 % | DIASTOLIC BLOOD PRESSURE: 64 MMHG

## 2020-01-09 DIAGNOSIS — N94.6 MENSTRUAL CRAMP: Primary | ICD-10-CM

## 2020-01-09 DIAGNOSIS — G43.809 OTHER MIGRAINE WITHOUT STATUS MIGRAINOSUS, NOT INTRACTABLE: ICD-10-CM

## 2020-01-09 DIAGNOSIS — E55.9 VITAMIN D DEFICIENCY: ICD-10-CM

## 2020-01-09 RX ORDER — RIZATRIPTAN BENZOATE 5 MG/1
5 TABLET, ORALLY DISINTEGRATING ORAL ONCE
Qty: 1 TAB | Refills: 0 | Status: SHIPPED | OUTPATIENT
Start: 2020-01-09 | End: 2020-01-09

## 2020-01-09 RX ORDER — INDOMETHACIN 25 MG/1
25 CAPSULE ORAL
Qty: 30 CAP | Refills: 1 | Status: SHIPPED | OUTPATIENT
Start: 2020-01-09 | End: 2020-02-08

## 2020-01-09 NOTE — PROGRESS NOTES
Health Maintenance Due   Topic Date Due    HPV Age 9Y-34Y (1 - Female 2-dose series) 05/01/2008    DTaP/Tdap/Td series (1 - Tdap) 05/01/2008    PAP AKA CERVICAL CYTOLOGY  05/01/2018       Chief Complaint   Patient presents with    Menstrual Problem     severe pain with menstruation    Attention Deficit Disorder    Depression       1. Have you been to the ER, urgent care clinic since your last visit? Hospitalized since your last visit? 12/02/2019    2. Have you seen or consulted any other health care providers outside of the [address] [place] [address] since your last visit? Include any pap smears or colon screening. No    3) Do you have an Advance Directive on file? no    4) Are you interested in receiving information on Advance Directives? NO      Patient is accompanied by self I have received verbal consent from Aramis Castorena to discuss any/all medical information while they are present in the room.

## 2020-01-09 NOTE — PROGRESS NOTES
HISTORY OF PRESENT ILLNESS  Fei Gr is a 27 y.o. female here with the complaining of menstrual cramp moderate to severe intensity for last several months. She is having regular menses. Also noticed to have bilateral headache wrist which is throbbing in nature associated with photophobia on and off for last several months. No weakness or numbness in the legs or arms no projectile vomiting. Otherwise she is doing well. She is diagnosed with major depression and ADHD. On medicine. Doing well. Lab work done recently. Discussed with her. She is supplementing vitamin D capsules, she is unable to swallow it would like to call some different form. Menstrual Problem   Associated symptoms include abdominal pain and headaches. Attention Deficit Disorder   Associated symptoms include abdominal pain and headaches. Depression   Associated symptoms include abdominal pain and headaches. Review of Systems   Constitutional: Negative. HENT: Negative. Eyes: Positive for photophobia. Respiratory: Negative. Cardiovascular: Negative. Gastrointestinal: Positive for abdominal pain. Genitourinary: Positive for menstrual problem. Musculoskeletal: Negative. Skin: Negative. Neurological: Positive for headaches. Endo/Heme/Allergies: Negative. Psychiatric/Behavioral: Negative. Physical Exam  Constitutional:       General: She is not in acute distress. Appearance: She is well-developed. HENT:      Head: Normocephalic and atraumatic. Right Ear: Tympanic membrane normal. There is impacted cerumen. Left Ear: Tympanic membrane normal. There is impacted cerumen. Nose: Nose normal.      Mouth/Throat:      Pharynx: No oropharyngeal exudate. Eyes:      Conjunctiva/sclera: Conjunctivae normal.      Pupils: Pupils are equal, round, and reactive to light. Neck:      Musculoskeletal: Normal range of motion and neck supple. Thyroid: No thyromegaly.       Trachea: No tracheal deviation. Cardiovascular:      Rate and Rhythm: Normal rate and regular rhythm. Heart sounds: Normal heart sounds. Pulmonary:      Effort: Pulmonary effort is normal. No respiratory distress. Breath sounds: Normal breath sounds. No wheezing. Abdominal:      General: Bowel sounds are normal. There is no distension. Palpations: Abdomen is soft. Tenderness: There is no tenderness. Musculoskeletal:         General: No tenderness. Neurological:      General: No focal deficit present. Mental Status: She is alert and oriented to person, place, and time. Mental status is at baseline. Cranial Nerves: No cranial nerve deficit. Motor: No abnormal muscle tone. Coordination: Coordination normal.      Deep Tendon Reflexes: Reflexes are normal and symmetric. Reflexes normal.   Psychiatric:         Mood and Affect: Mood normal.         Behavior: Behavior normal.         ASSESSMENT and PLAN  Diagnoses and all orders for this visit:    1. Menstrual cramp    Will call in,  -     indomethacin (INDOCIN) 25 mg capsule; Take 1 Cap by mouth two (2) times daily as needed for Pain for up to 30 days.  -     REFERRAL TO OBSTETRICS AND GYNECOLOGY  Her sister has endometriosis. She is worried about it. Advised her to see GYN. 2. Other migraine without status migrainosus, not intractable    Will give,  -     rizatriptan (MAXALT-MLT) 5 mg rapid dissolve tablet; Take 1 Tab by mouth once for 1 dose. 3. Vitamin D deficiency    Not able to swallow any capsules or pills. Will change,  -     cholecalciferol (VITAMIN D3) (50,000 Units /1250 mcg) wafer; Take 1 Wafer by mouth every seven (7) days for 30 days. Discussed expected course/resolution/complications of diagnosis in detail with patient. Medication risks/benefits/costs/interactions/alternatives discussed with patient. Pt was given an after visit summary which includes diagnoses, current medications & vitals.    Pt expressed understanding with the diagnosis and plan.

## 2020-01-10 ENCOUNTER — TELEPHONE (OUTPATIENT)
Dept: INTERNAL MEDICINE CLINIC | Age: 25
End: 2020-01-10

## 2020-01-10 NOTE — TELEPHONE ENCOUNTER
[facility] faxed request for alternative for Replesta 50,000 units wafer. This is not covered by the patient's insurance.

## 2020-01-14 ENCOUNTER — TELEPHONE (OUTPATIENT)
Dept: INTERNAL MEDICINE CLINIC | Age: 25
End: 2020-01-14

## 2020-01-14 NOTE — TELEPHONE ENCOUNTER
Call placed to patient and she states she can not swallow capsules, advised to reach out to her pharmacist to inquire if indocin cap can be opened and put into applesauce/pudding and if OTC vit d 5000 unit caps can also be opened, if so per provider to take vit d 5000 unit capsule take 2 capsule

## 2020-01-14 NOTE — TELEPHONE ENCOUNTER
Pt states she spoke with nurse Candelario Crocker) re;indocin cap. She did reach out to the pharmacy and was told indocin can not be broken up. Needs advice on what to do next.   Pls call

## 2020-04-18 DIAGNOSIS — Z86.59 HISTORY OF ATTENTION DEFICIT HYPERACTIVITY DISORDER (ADHD): ICD-10-CM

## 2020-04-20 RX ORDER — DEXTROAMPHETAMINE SACCHARATE, AMPHETAMINE ASPARTATE, DEXTROAMPHETAMINE SULFATE AND AMPHETAMINE SULFATE 2.5; 2.5; 2.5; 2.5 MG/1; MG/1; MG/1; MG/1
10 TABLET ORAL 2 TIMES DAILY
Qty: 60 TAB | Refills: 0 | Status: SHIPPED | OUTPATIENT
Start: 2020-04-20 | End: 2020-09-21

## 2020-05-19 ENCOUNTER — E-VISIT (OUTPATIENT)
Dept: INTERNAL MEDICINE CLINIC | Age: 25
End: 2020-05-19

## 2020-05-27 ENCOUNTER — VIRTUAL VISIT (OUTPATIENT)
Dept: OBGYN CLINIC | Age: 25
End: 2020-05-27

## 2020-05-27 DIAGNOSIS — N94.6 DYSMENORRHEA: Primary | ICD-10-CM

## 2020-05-27 NOTE — PROGRESS NOTES
[facility] Video visit    Jan Barriga is a 23 y.o. female who was seen by synchronous (real-time) audio-video technology on 5/27/2020. Of note, pt's camera was not functioning properly at time of visit so this was voice-only call. Consent: Jan Barriga, who was seen by synchronous (real-time) audio-video technology, and/or her healthcare decision maker, is aware that this patient-initiated, Telehealth encounter on 5/27/2020 is a billable service, with coverage as determined by her insurance carrier. She is aware that she may receive a bill and has provided verbal consent to proceed: Yes. Problem Visit    Jan Barriga is a 23 y.o. G0 presenting for problem visit. Her main concern today is dysmenorrhea beginning 1-2 years ago. Pt wonders if she could have endometriosis as this runs in her family. She takes ibuprofen with some relief of pain. Menses are regular and monthly, just extremely painful. She has never been sexually active. PMH of ADHD, no other medications. No CI to estrogen identified. Ob/Gyn Hx:  G0  LMP-5/22/20  Menses- regular  Contraception-denies  STI- denies  ? SA-never    Health maintenance:  Pap-denies  Goldie Young    Past Medical History:   Diagnosis Date    ADHD     Asthma     Depression     Headache     Psychotic disorder ([facility] [address]        History reviewed. No pertinent surgical history.     Family History   Problem Relation Age of Onset    No Known Problems Mother     Cancer Father         brain ca    Lung Disease Father         COPD       Social History     Socioeconomic History    Marital status: SINGLE     Spouse name: Not on file    Number of children: Not on file    Years of education: Not on file    Highest education level: Not on file   Occupational History    Not on file   Social Needs    Financial resource strain: Not on file    Food insecurity     Worry: Not on file     Inability: Not on file    Transportation needs     Medical: Not on file     Non-medical: Not on file   Tobacco Use    Smoking status: Never Smoker    Smokeless tobacco: Never Used   Substance and Sexual Activity    Alcohol use: Never     Frequency: Never    Drug use: Never    Sexual activity: Not Currently     Comment: single,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   Lifestyle    Physical activity     Days per week: Not on file     Minutes per session: Not on file    Stress: Not on file   Relationships    Social connections     Talks on phone: Not on file     Gets together: Not on file     Attends Restorationism service: Not on file     Active member of club or organization: Not on file     Attends meetings of clubs or organizations: Not on file     Relationship status: Not on file    Intimate partner violence     Fear of current or ex partner: Not on file     Emotionally abused: Not on file     Physically abused: Not on file     Forced sexual activity: Not on file   Other Topics Concern    Not on file   Social History Narrative    ** Merged History Encounter **            Current Outpatient Medications   Medication Sig Dispense Refill    dextroamphetamine-amphetamine (ADDERALL) 10 mg tablet Take 1 Tab by mouth two (2) times a day. Max Daily Amount: 20 mg. 60 Tab 0    albuterol (PROVENTIL HFA, VENTOLIN HFA, PROAIR HFA) 90 mcg/actuation inhaler TAKE 1 PUFF BY INHALATION EVERY SIX (6) HOURS AS NEEDED FOR WHEEZING.  1 Inhaler 3       No Known Allergies    Review of Systems - History obtained from the patient  Constitutional: negative for weight loss, fever, night sweats  HEENT: negative for hearing loss, earache, congestion, snoring, sorethroat  CV: negative for chest pain, palpitations, edema  Resp: negative for cough, shortness of breath, wheezing  GI: negative for change in bowel habits, abdominal pain, black or bloody stools  : negative for frequency, dysuria, hematuria, vaginal discharge  MSK: negative for back pain, joint pain, muscle pain  Breast: negative for breast lumps, nipple discharge, galactorrhea  Skin :negative for itching, rash, hives  Neuro: negative for dizziness, headache, confusion, weakness  Psych: negative for anxiety, depression, change in mood  Heme/lymph: negative for bleeding, bruising, pallor    Physical Exam    General - alert, cooperative, no distress    Neurologic/Psychiatric  · Mental Status:  · Orientation: grossly oriented to person, place and time  · Mood and Affect: mood normal, affect appropriate      Assessment/Plan:  23 y.o. G0 with dysmenorrhea. Cannot exclude endometriosis. -menstrual calendar/pain diary  -nsaids scheduled with menses  -reviewed hormonal options for menstrual regulation, pt would like to do trial of OrthoEvra patch    RTC: 3 months for follow up and BP check after initiating patch    Ken Carrel, MD  5/27/2020  2:13 PM               We discussed the expected course, resolution and complications of the diagnosis(es) in detail. Medication risks, benefits, costs, interactions, and alternatives were discussed as indicated. I advised her to contact the office if her condition worsens, changes or fails to improve as anticipated. She expressed understanding with the diagnosis(es) and plan. Karina Mojica is a 23 y.o. female who was evaluated by a phone encounter for concerns as above. Patient identification was verified prior to start of the visit. A caregiver was present when appropriate. Due to this being a TeleHealth encounter (During YQAXY-56 public health emergency), evaluation of the following organ systems was limited: Vitals/Constitutional/EENT/Resp/CV/GI//MS/Neuro/Skin/Heme-Lymph-Imm.   Pursuant to the emergency declaration under the [facility]5 waiver authority and the [employer] and [employer] Act, this Virtual  Visit was conducted, with patient's (and/or legal guardian's) consent, to reduce the patient's risk of exposure to COVID-19 and provide necessary medical care. Services were provided through a video synchronous discussion virtually to substitute for in-person clinic visit. Patient and provider were located at their individual homes.

## 2020-05-27 NOTE — PATIENT INSTRUCTIONS
Painful Menstrual Cramps: Care Instructions Your Care Instructions Painful menstrual cramps are very common. Many women go to the doctor because of bad cramps when they get their period. You may have cramps in your back, thighs, and belly. You may also have diarrhea, constipation, or nausea. Some women also get dizzy. Pain medicine and home treatment can help you feel better. Follow-up care is a key part of your treatment and safety. Be sure to make and go to all appointments, and call your doctor if you are having problems. It's also a good idea to know your test results and keep a list of the medicines you take. How can you care for yourself at home? · Take anti-inflammatory medicines for pain. Ibuprofen (Advil, Motrin) and naproxen (Aleve) usually work better than aspirin. ? Be safe with medicines. Talk to your doctor or pharmacist before you take any of these medicines. They may not be safe if you take other medicines or have other health problems. ? Start taking the recommended dose of pain medicine as soon as you start to feel pain. Or you can start on the day before your period. Keep taking the medicine for as many days as you have cramps. ? If anti-inflammatory medicines don't help, try acetaminophen (Tylenol). ? Do not take two or more pain medicines at the same time unless the doctor told you to. Many pain medicines have acetaminophen, which is Tylenol. Too much acetaminophen (Tylenol) can be harmful. ? Read and follow all instructions on the label. · Put a heating pad set on low or a hot water bottle on your belly. Or take a warm bath. Heat improves blood flow and may help with pain. · Lie down and put a pillow under your knees. Or lie on your side and bring your knees up to your chest. This will help with any back pressure. · Get at least 30 minutes of exercise on most days of the week. This improves blood flow and may decrease pain. Walking is a good choice.  You also may want to do other activities, such as running, swimming, cycling, or playing tennis or team sports. When should you call for help? Call your doctor now or seek immediate medical care if: 
  · You have new or worse belly or pelvic pain.  
  · You have severe vaginal bleeding.  
 Watch closely for changes in your health, and be sure to contact your doctor if: 
  · You have unusual vaginal bleeding.  
  · You do not get better as expected. Where can you learn more? Go to http://olga-rico.info/ Enter [id number] in the search box to learn more about \"Painful Menstrual Cramps: Care Instructions. \" Current as of: November 7, 2019Content Version: 12.4 © 5782-6081 [employer]. Care instructions adapted under license by [employer] (which disclaims liability or warranty for this information). If you have questions about a medical condition or this instruction, always ask your healthcare professional. [place] [address] any warranty or liability for your use of this information.

## 2020-06-07 ENCOUNTER — E-VISIT (OUTPATIENT)
Dept: INTERNAL MEDICINE CLINIC | Age: 25
End: 2020-06-07

## 2020-06-16 DIAGNOSIS — R06.2 WHEEZING: ICD-10-CM

## 2020-06-16 RX ORDER — ALBUTEROL SULFATE 90 UG/1
1 AEROSOL, METERED RESPIRATORY (INHALATION)
Qty: 1 INHALER | Refills: 3 | Status: SHIPPED | OUTPATIENT
Start: 2020-06-16 | End: 2021-05-19 | Stop reason: SDUPTHER

## 2020-06-18 ENCOUNTER — TELEPHONE (OUTPATIENT)
Dept: INTERNAL MEDICINE CLINIC | Age: 25
End: 2020-06-18

## 2020-06-18 ENCOUNTER — VIRTUAL VISIT (OUTPATIENT)
Dept: INTERNAL MEDICINE CLINIC | Age: 25
End: 2020-06-18

## 2020-06-18 DIAGNOSIS — G43.809 OTHER MIGRAINE WITHOUT STATUS MIGRAINOSUS, NOT INTRACTABLE: Primary | ICD-10-CM

## 2020-06-18 DIAGNOSIS — B00.1 COLD SORE: ICD-10-CM

## 2020-06-18 DIAGNOSIS — E55.9 VITAMIN D DEFICIENCY: ICD-10-CM

## 2020-06-18 DIAGNOSIS — F32.1 MAJOR DEPRESSIVE DISORDER, SINGLE EPISODE, MODERATE WITH ANXIOUS DISTRESS (HCC): ICD-10-CM

## 2020-06-18 RX ORDER — VALACYCLOVIR HYDROCHLORIDE 500 MG/1
500 TABLET, FILM COATED ORAL 2 TIMES DAILY
Qty: 14 TAB | Refills: 0 | Status: SHIPPED | OUTPATIENT
Start: 2020-06-18 | End: 2020-09-21

## 2020-06-18 RX ORDER — RIZATRIPTAN BENZOATE 5 MG/1
5 TABLET, ORALLY DISINTEGRATING ORAL ONCE
Qty: 12 TAB | Refills: 1 | Status: SHIPPED | OUTPATIENT
Start: 2020-06-18 | End: 2020-06-18

## 2020-06-18 RX ORDER — AMOXICILLIN 500 MG/1
CAPSULE ORAL
COMMUNITY
Start: 2020-06-17 | End: 2020-07-27

## 2020-06-18 RX ORDER — CHOLECALCIFEROL (VITAMIN D3) 12.5 MCG/5
LIQUID (ML) ORAL
Qty: 480 ML | Refills: 0 | Status: SHIPPED | OUTPATIENT
Start: 2020-06-18 | End: 2020-09-21

## 2020-06-18 RX ORDER — HYDROCODONE BITARTRATE AND ACETAMINOPHEN 7.5; 325 MG/1; MG/1
TABLET ORAL
COMMUNITY
Start: 2020-06-17 | End: 2020-07-27

## 2020-06-18 NOTE — TELEPHONE ENCOUNTER
----- Message from Jas Hdez sent at 6/18/2020  3:38 PM EDT -----  Regarding: Dr. Gautam Jolly: [phone]  Patient is returning a missed call from Dr. Marcelino Castro.

## 2020-06-18 NOTE — PROGRESS NOTES
Health Maintenance Due   Topic Date Due    HPV Age 9Y-34Y (1 - 2-dose series) 05/01/2008    DTaP/Tdap/Td series (1 - Tdap) 05/01/2018    PAP AKA CERVICAL CYTOLOGY  05/01/2018       Chief Complaint   Patient presents with    Vitamin D Deficiency    Headache     migraine    Depression       1. Have you been to the ER, urgent care clinic since your last visit? Hospitalized since your last visit? No    2. Have you seen or consulted any other health care providers outside of the [address] [place] [address] since your last visit? Include any pap smears or colon screening. Yes, dentist yesterday    3) Do you have an Advance Directive on file? no    4) Are you interested in receiving information on Advance Directives? NO      Patient is accompanied by self I have received verbal consent from Arun Kinney to discuss any/all medical information while they are present in the room.

## 2020-07-27 ENCOUNTER — APPOINTMENT (OUTPATIENT)
Dept: GENERAL RADIOLOGY | Age: 25
End: 2020-07-27
Attending: PEDIATRICS
Payer: MEDICAID

## 2020-07-27 ENCOUNTER — HOSPITAL ENCOUNTER (EMERGENCY)
Age: 25
Discharge: HOME | End: 2020-07-27
Attending: PEDIATRICS
Payer: MEDICAID

## 2020-07-27 VITALS
DIASTOLIC BLOOD PRESSURE: 74 MMHG | SYSTOLIC BLOOD PRESSURE: 118 MMHG | TEMPERATURE: 98.4 F | BODY MASS INDEX: 22.31 KG/M2 | RESPIRATION RATE: 18 BRPM | WEIGHT: 110.45 LBS | HEART RATE: 79 BPM | OXYGEN SATURATION: 100 %

## 2020-07-27 DIAGNOSIS — R07.9 ACUTE CHEST PAIN: Primary | ICD-10-CM

## 2020-07-27 LAB
ATRIAL RATE: 85 BPM
CALCULATED P AXIS, ECG09: 48 DEGREES
CALCULATED R AXIS, ECG10: 86 DEGREES
CALCULATED T AXIS, ECG11: 63 DEGREES
DIAGNOSIS, 93000: NORMAL
HCG UR QL: NEGATIVE
P-R INTERVAL, ECG05: 164 MS
Q-T INTERVAL, ECG07: 362 MS
QRS DURATION, ECG06: 84 MS
QTC CALCULATION (BEZET), ECG08: 430 MS
VENTRICULAR RATE, ECG03: 85 BPM

## 2020-07-27 PROCEDURE — 81025 URINE PREGNANCY TEST: CPT

## 2020-07-27 PROCEDURE — 99284 EMERGENCY DEPT VISIT MOD MDM: CPT

## 2020-07-27 PROCEDURE — 71046 X-RAY EXAM CHEST 2 VIEWS: CPT

## 2020-07-27 PROCEDURE — 93005 ELECTROCARDIOGRAM TRACING: CPT

## 2020-07-27 NOTE — ED PROVIDER NOTES
The history is provided by the patient. Chest Pain    This is a recurrent (happens once and awhile at night) problem. The problem has been rapidly improving. Associated with: usually when in bed. The pain is present in the substernal region and left side. The pain is moderate. The quality of the pain is described as sharp. Radiates to: normally does not bu tongiht to left arm. Pertinent negatives include no abdominal pain, no back pain, no cough, no diaphoresis, no dizziness, no exertional chest pressure, no fever, no headaches, no irregular heartbeat, no malaise/fatigue, no nausea, no numbness, no palpitations, no shortness of breath, no vomiting and no weakness. She has tried nothing for the symptoms. Risk factors include no risk factors. IMM UTD    Past Medical History:   Diagnosis Date    ADHD     Asthma     Depression     Headache     Psychotic disorder ([facility] [address]        History reviewed. No pertinent surgical history.       Family History:   Problem Relation Age of Onset    No Known Problems Mother     Cancer Father         brain ca    Lung Disease Father         COPD       Social History     Socioeconomic History    Marital status: SINGLE     Spouse name: Not on file    Number of children: Not on file    Years of education: Not on file    Highest education level: Not on file   Occupational History    Not on file   Social Needs    Financial resource strain: Not on file    Food insecurity     Worry: Not on file     Inability: Not on file    Transportation needs     Medical: Not on file     Non-medical: Not on file   Tobacco Use    Smoking status: Never Smoker    Smokeless tobacco: Never Used   Substance and Sexual Activity    Alcohol use: Never     Frequency: Never    Drug use: Never    Sexual activity: Never     Comment: single,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   Lifestyle    Physical activity     Days per week: Not on file     Minutes per session: Not on file    Stress: Not on file   Relationships    Social connections     Talks on phone: Not on file     Gets together: Not on file     Attends [employer] service: Not on file     Active member of club or organization: Not on file     Attends meetings of clubs or organizations: Not on file     Relationship status: Not on file    Intimate partner violence     Fear of current or ex partner: Not on file     Emotionally abused: Not on file     Physically abused: Not on file     Forced sexual activity: Not on file   Other Topics Concern    Not on file   Social History Narrative    ** Merged History Encounter **              ALLERGIES: Patient has no known allergies. Review of Systems   Constitutional: Negative for diaphoresis, fever and malaise/fatigue. HENT: Negative for sore throat. Eyes: Negative for visual disturbance. Respiratory: Negative for cough, choking, chest tightness and shortness of breath. Cardiovascular: Positive for chest pain. Negative for palpitations. Gastrointestinal: Negative for abdominal pain, diarrhea, nausea and vomiting. Genitourinary: Negative for dysuria. Musculoskeletal: Negative for back pain. Skin: Negative for rash. Allergic/Immunologic: Negative for immunocompromised state. Neurological: Negative for dizziness, weakness, numbness and headaches. Hematological: Does not bruise/bleed easily. Psychiatric/Behavioral: Negative for confusion. Vitals:    07/27/20 0242   BP: 133/81   Pulse: 92   Resp: 20   Temp: 98.6 °F (37 °C)   SpO2: 100%   Weight: 50.1 kg (110 lb 7.2 oz)            Physical Exam   Physical Exam   Constitutional: Appears well-developed and well-nourished. active. No distress. HENT:   Head: NCAT  Ears: Right Ear: Tympanic membrane normal. Left Ear: Tympanic membrane normal.   Nose: Nose normal. No nasal discharge. Mouth/Throat: Mucous membranes are moist. Pharynx is normal.   Eyes: Conjunctivae are normal. Right eye exhibits no discharge. Left eye exhibits no discharge.    Neck: Normal range of motion. Neck supple. Cardiovascular: Normal rate, regular rhythm, S1 normal and S2 normal. No murmur   2+ distal pulses   Pulmonary/Chest: Effort normal and breath sounds normal. No nasal flaring or stridor. No respiratory distress. no wheezes. no rhonchi. no rales. no retraction. Abdominal: Soft. . No tenderness. no guarding. No masses or HSM  Musculoskeletal: Normal range of motion. no edema, no tenderness, no deformity and no signs of injury. Lymphadenopathy:     no cervical adenopathy. Neurological:  alert. normal strength. normal muscle tone. No focal defecits  Skin: Skin is warm and dry. Capillary refill takes less than 3 seconds. Turgor is normal. No petechiae, no purpura and no rash noted. No cyanosis. [facility]      ED EKG interpretation:  Rhythm: normal sinus rhythm; and regular . Rate (approx.): 85; Axis: normal; QRS interval: normal ; ST/T wave: normal; QTc 430; This EKG was interpreted by Hazel Groves MD, ED Provider. Patient is well hydrated, well appearing, and in no respiratory distress. Physical exam is reassuring, and without signs of serious illness. Given pt's age, risk factors, and characteristics of pain, as well as normal ECG and CXR, the likelihood that this chest pain is caused by cardiac or pulmonary etiology is extremely low. Therefore the patient will be discharged home with a diagnosis of noncardiac chest pain, with instructions to follow up with the PCP in 3 days. Patient instructed on signs and symptoms of more concerning chest pain, including worsening pain, shortness of breath, syncope, orthopnea, dyspnea on exertion and fever. Also instructed to call or return should any of these symptoms occur. No diagnosis found. Current Discharge Medication List          Follow-up Information    None         3:27 AM  Alonzo Traore M.D.     Procedures

## 2020-07-27 NOTE — ED NOTES
Patient awake, alert, and in no distress. Discharge instructions and education given to patient. Verbalized understanding of discharge instructions. Patient walked out of ED with mother. Morgan Wolff

## 2020-07-27 NOTE — ED TRIAGE NOTES
Triage Note: Pt. States she was doing her homework about 30 min. Ago, pt. C/o chest pressure that radiates to back and left arm. No Meds PTA.

## 2020-07-27 NOTE — DISCHARGE INSTRUCTIONS

## 2020-08-25 ENCOUNTER — OFFICE VISIT (OUTPATIENT)
Dept: URGENT CARE | Age: 25
End: 2020-08-25
Payer: MEDICAID

## 2020-08-25 VITALS — RESPIRATION RATE: 16 BRPM | HEART RATE: 92 BPM | TEMPERATURE: 99 F | OXYGEN SATURATION: 99 %

## 2020-08-25 DIAGNOSIS — Z11.59 SCREENING FOR VIRAL DISEASE: Primary | ICD-10-CM

## 2020-08-25 PROCEDURE — 99202 OFFICE O/P NEW SF 15 MIN: CPT | Performed by: NURSE PRACTITIONER

## 2020-08-25 NOTE — LETTER
August 25, 2020 [address]  [address] [id number] Dear Nancy Ryan: Thank you for requesting access to eBuddy. Please follow the instructions below to securely access and download your online medical record. eBuddy allows you to send messages to your doctor, view your test results, renew your prescriptions, schedule appointments, and more. How Do I Sign Up? 1. In your internet browser, go to [email or web address]. 2. Click on the First Time User? Click Here link in the Sign In box. You will see the New Member Sign Up page. 3. Enter your eBuddy Access Code exactly as it appears below. You will not need to use this code after youve completed the sign-up process. If you do not sign up before the expiration date, you must request a new code. eBuddy Access Code: Activation code not generated Current eBuddy Status: Active 4. Enter the last four digits of your Social Security Number (xxxx) and Date of Birth (mm/dd/yyyy) as indicated and click Submit. You will be taken to the next sign-up page. 5. Create a eBuddy ID. This will be your eBuddy login ID and cannot be changed, so think of one that is secure and easy to remember. 6. Create a eBuddy password. You can change your password at any time. 7. Enter your Password Reset Question and Answer. This can be used at a later time if you forget your password. 8. Enter your e-mail address. You will receive e-mail notification when new information is available in [address]. 9. Click Sign Up. You can now view and download portions of your medical record. 10. Click the Download Summary menu link to download a portable copy of your medical information. Additional Information If you have questions, please visit the Frequently Asked Questions section of the eBuddy website at [email or web address] Remember, eBuddy is NOT to be used for urgent needs.  For medical emergencies, dial 911. Now available from your iPhone and Android! Sincerely, [employer]

## 2020-08-25 NOTE — PROGRESS NOTES
Subjective: (As above and below)       This patient was seen in [facility] Clinic at [address] [place] [address] Urgent Care while in their vehicle due to COVID-19 pandemic with PPE and focused examination in order to decrease community viral transmission. The patient/guardian gave verbal consent to treat. Chief Complaint   Patient presents with    Covid Testing     no symptoms patient needs a covid test to go to work     Ross Shaw is a 23 y.o. female who presents for COVID-19 testing  Required for: employment  Currently has not tried any therapies and denies any symptoms at this point in time. Feels well otherwise. Recent travel: no  Known Exposure to COVID-19: no  Known flu or strep contact: no         ROS- negative for dizziness, cough, SOB, rhinorrhea, myalgias, n/v/d, rashes, headaches, fevers, chills, chest pains. Review of Systems - negative except as listed above    Reviewed PmHx, RxHx, FmHx, SocHx, AllgHx and updated in chart. Family History   Problem Relation Age of Onset    No Known Problems Mother     Cancer Father         brain ca    Lung Disease Father         COPD       Past Medical History:   Diagnosis Date    ADHD     Asthma     Depression     Headache     Psychotic disorder ([facility] [address]       Social History     Socioeconomic History    Marital status: SINGLE     Spouse name: Not on file    Number of children: Not on file    Years of education: Not on file    Highest education level: Not on file   Tobacco Use    Smoking status: Never Smoker    Smokeless tobacco: Never Used   Substance and Sexual Activity    Alcohol use: Never     Frequency: Never    Drug use: Never    Sexual activity: Never     Comment: single,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   Social History Narrative    ** Merged History Encounter **               Current Outpatient Medications   Medication Sig    valACYclovir (VALTREX) 500 mg tablet Take 1 Tab by mouth two (2) times a day.     cholecalciferol, vitamin D3, 12.5 mcg/5 mL (500 unit/5 mL) liqd 2 tsp BID    albuterol (PROVENTIL HFA, VENTOLIN HFA, PROAIR HFA) 90 mcg/actuation inhaler Take 1 Puff by inhalation every six (6) hours as needed for Wheezing.  dextroamphetamine-amphetamine (ADDERALL) 10 mg tablet Take 1 Tab by mouth two (2) times a day. Max Daily Amount: 20 mg. No current facility-administered medications for this visit. Objective:     Vitals:    08/25/20 1737   Pulse: 92   Resp: 16   Temp: 99 °F (37.2 °C)   SpO2: 99%       Physical Exam  General appearance - appears well hydrated and does not appear toxic, no acute distress  Eyes - EOMs intact. Non injected. No scleral icterus   Ears - no external swelling  Nose - No purulent drainage  Neck/Lymphatics - trachea midline, full AROM  Chest - normal breathing effort. No active cough heard. No audible wheezing. Heart - HR-see vitals  Skin - no observable rashes or pallor  Neurologic- alert and oriented x 3  Psychiatric- normal mood, behavior and though content. Assessment/ Plan:     1. Screening for viral disease    - NOVEL CORONAVIRUS (COVID-19)      Will test for COVID  Supportive home care reviewed for any development of mild symptoms - tylenol, maintain adequate fluid intake, deep breathing exercises  Self isolate/quarantine advised based on symptoms/lab results as recommended in current CDC guidelines. Follow up: We have reviewed, in detail, particular presentations/worsening/concerning signs and symptoms that may warrant seeking immediate care in the ED setting and patient verbalized being aware of what to watch for. For other non-severe changes, non-improvement or questions, patient aware to contact PCP office or consider a virtual online medical consultation. Reviewed with her that COVID-19 pandemic is an evolving situation with rapidly changing recommendations & guidelines. Medical decisions are made based on the the best information available at the time.   Recommended she stay tuned for updates published by trusted sources and to advise your PCP of any unexpected changes in clinical condition     Launie Fleischer, NP

## 2020-08-27 LAB — SARS-COV-2, NAA: NOT DETECTED

## 2020-09-21 ENCOUNTER — APPOINTMENT (OUTPATIENT)
Dept: GENERAL RADIOLOGY | Age: 25
End: 2020-09-21
Attending: EMERGENCY MEDICINE
Payer: MEDICAID

## 2020-09-21 ENCOUNTER — HOSPITAL ENCOUNTER (EMERGENCY)
Age: 25
Discharge: HOME | End: 2020-09-21
Attending: EMERGENCY MEDICINE
Payer: MEDICAID

## 2020-09-21 VITALS
TEMPERATURE: 98.4 F | RESPIRATION RATE: 16 BRPM | SYSTOLIC BLOOD PRESSURE: 113 MMHG | DIASTOLIC BLOOD PRESSURE: 83 MMHG | OXYGEN SATURATION: 100 % | HEART RATE: 78 BPM

## 2020-09-21 DIAGNOSIS — K22.89 ESOPHAGEAL PAIN: Primary | ICD-10-CM

## 2020-09-21 DIAGNOSIS — K29.90 GASTRITIS AND DUODENITIS: ICD-10-CM

## 2020-09-21 DIAGNOSIS — K21.9 GASTROESOPHAGEAL REFLUX DISEASE WITHOUT ESOPHAGITIS: ICD-10-CM

## 2020-09-21 DIAGNOSIS — R07.89 ATYPICAL CHEST PAIN: ICD-10-CM

## 2020-09-21 LAB
ATRIAL RATE: 94 BPM
CALCULATED P AXIS, ECG09: 67 DEGREES
CALCULATED R AXIS, ECG10: 82 DEGREES
CALCULATED T AXIS, ECG11: 42 DEGREES
DIAGNOSIS, 93000: NORMAL
P-R INTERVAL, ECG05: 166 MS
Q-T INTERVAL, ECG07: 346 MS
QRS DURATION, ECG06: 80 MS
QTC CALCULATION (BEZET), ECG08: 432 MS
VENTRICULAR RATE, ECG03: 94 BPM

## 2020-09-21 PROCEDURE — 74011000250 HC RX REV CODE- 250: Performed by: STUDENT IN AN ORGANIZED HEALTH CARE EDUCATION/TRAINING PROGRAM

## 2020-09-21 PROCEDURE — 74011250637 HC RX REV CODE- 250/637: Performed by: STUDENT IN AN ORGANIZED HEALTH CARE EDUCATION/TRAINING PROGRAM

## 2020-09-21 PROCEDURE — 71045 X-RAY EXAM CHEST 1 VIEW: CPT

## 2020-09-21 PROCEDURE — 93005 ELECTROCARDIOGRAM TRACING: CPT

## 2020-09-21 PROCEDURE — 99284 EMERGENCY DEPT VISIT MOD MDM: CPT

## 2020-09-21 RX ORDER — FAMOTIDINE 20 MG/1
20 TABLET, FILM COATED ORAL 2 TIMES DAILY
Qty: 20 TAB | Refills: 0 | Status: SHIPPED | OUTPATIENT
Start: 2020-09-21 | End: 2020-10-01

## 2020-09-21 RX ADMIN — LIDOCAINE HYDROCHLORIDE 40 ML: 20 SOLUTION ORAL; TOPICAL at 13:35

## 2020-09-21 NOTE — ED PROVIDER NOTES
EMERGENCY DEPARTMENT HISTORY AND PHYSICAL EXAM          Date: 9/21/2020  Patient Name: Leatha Turner  Attending of Record: Silke Richards MD    History of Presenting Illness     Chief Complaint   Patient presents with    Chest Pain     complaint of midsternal chest pain, sharp, constant, x 30 min after swallowing a bolus of food and feeling like it did not go down smoothly. pt has had issues with getting food stuck in her throat previously       History Provided By: Patient    HPI: Leatha Turner is a 23 y.o. female, no past medical history presents with chest pain. She notes the pain started over the course of 15-30 minutes 1-2 hours ago. She notes she has been able to drink water but takes a while to go down. She notes the pain is pressure-like and feels like something is stuck subxiphoid area. She has had this in the past.  No recent heavy foods or meats - ate cereal earlier today. No SOB, ab pain, nausea, vomiting, fever. PCP: Bob Young MD    There are no other complaints, changes, or physical findings at this time. Current Facility-Administered Medications   Medication Dose Route Frequency Provider Last Rate Last Dose    sod bicarb-citric ac-simeth (EZ GAS II) 2.21-1.53 gram/4 gram contrast packet 4 g  4 g Oral NOW Vini Bello MD        maalox/viscous lidocaine (COV GI COCKTAIL)  40 mL Oral ONCE Arthur Milligan MD         Current Outpatient Medications   Medication Sig Dispense Refill    albuterol (PROVENTIL HFA, VENTOLIN HFA, PROAIR HFA) 90 mcg/actuation inhaler Take 1 Puff by inhalation every six (6) hours as needed for Wheezing.  1 Inhaler 3       Past History     Past Medical History:  Past Medical History:   Diagnosis Date    ADHD     Asthma     Depression     Headache     Psychotic disorder ([facility] [address]        Past Surgical History:  Denies    Family History:  Family History   Problem Relation Age of Onset    No Known Problems Mother     Cancer Father         brain ca    Lung Disease Father         COPD       Social History:  Social History     Tobacco Use    Smoking status: Never Smoker    Smokeless tobacco: Never Used   Substance Use Topics    Alcohol use: Never     Frequency: Never    Drug use: Never       Allergies:  No Known Allergies      Review of Systems   Review of Systems   Constitutional: Negative for chills and fever. HENT: Negative for congestion and sore throat. Eyes: Negative for visual disturbance. Respiratory: Positive for chest tightness. Negative for cough and shortness of breath. Cardiovascular: Positive for chest pain. Negative for leg swelling. Gastrointestinal: Negative for abdominal pain, blood in stool, diarrhea, nausea and vomiting. Endocrine: Negative for polyuria. Genitourinary: Negative for dysuria and flank pain. Musculoskeletal: Negative for myalgias. Skin: Negative for rash. Allergic/Immunologic: Negative for immunocompromised state. Neurological: Negative for weakness and headaches. Hematological: Negative. Psychiatric/Behavioral: Negative for confusion. Physical Exam   Physical Exam  Constitutional:       Appearance: Normal appearance. HENT:      Head: Normocephalic and atraumatic. Nose: Nose normal.      Mouth/Throat:      Mouth: Mucous membranes are moist.   Eyes:      Extraocular Movements: Extraocular movements intact. Conjunctiva/sclera: Conjunctivae normal.      Pupils: Pupils are equal, round, and reactive to light. Neck:      Musculoskeletal: Normal range of motion. Cardiovascular:      Rate and Rhythm: Normal rate and regular rhythm. Heart sounds: Normal heart sounds. Comments: No crepitus  Pulmonary:      Effort: Pulmonary effort is normal.      Breath sounds: Normal breath sounds. Abdominal:      General: Abdomen is flat. Palpations: Abdomen is soft. Musculoskeletal: Normal range of motion. General: No swelling or deformity. Skin:     General: Skin is warm. Neurological:      General: No focal deficit present. Mental Status: She is alert and oriented to person, place, and time. Mental status is at baseline. Psychiatric:         Mood and Affect: Mood normal.         Thought Content: Thought content normal.          Diagnostic Study Results     Labs -     Recent Results (from the past 12 hour(s))   EKG, 12 LEAD, INITIAL    Collection Time: 09/21/20 11:43 AM   Result Value Ref Range    Ventricular Rate 94 BPM    Atrial Rate 94 BPM    P-R Interval 166 ms    QRS Duration 80 ms    Q-T Interval 346 ms    QTC Calculation (Bezet) 432 ms    Calculated P Axis 67 degrees    Calculated R Axis 82 degrees    Calculated T Axis 42 degrees    Diagnosis       Normal sinus rhythm  Nonspecific T wave abnormality    Confirmed by Nisha Craven ([id number]) on 9/21/2020 1:24:31 PM         Radiologic Studies -   XR CHEST PORT   Final Result   IMPRESSION: No Acute Disease. CT Results  (Last 48 hours)    None        CXR Results  (Last 48 hours)               09/21/20 1249  XR CHEST PORT Final result    Impression:  IMPRESSION: No Acute Disease. Narrative:  EXAM: Portable CXR. 1229 hours         INDICATION: chest pain       FINDINGS:   The lungs appear clear. Heart is normal in size. There is no pulmonary edema. There is no evident pneumothorax, adenopathy or pleural effusion. Medical Decision Making   I am the first provider for this patient. I reviewed the vital signs, available nursing notes, past medical history, past surgical history, family history and social history. Vital Signs-Reviewed the patient's vital signs.   Patient Vitals for the past 12 hrs:   Temp Pulse Resp BP SpO2   09/21/20 1247 -- 78 16 113/79 100 %   09/21/20 1245 -- -- -- 113/79 --   09/21/20 1152 98.4 °F (36.9 °C) 96 18 (!) 119/100 100 %       Pulse Oximetry Analysis - 100% on RA    Cardiac Monitor:   Rate: 96 bpm  Rhythm: Normal Sinus Rhythm      ED EKG interpretation:  Rhythm: normal sinus rhythm; and regular . Rate (approx.): 94; Axis: normal; P wave: normal; QRS interval: normal ; ST/T wave: normal; Other findings: normal. This EKG was interpreted by Kaylan Neville M.D. Records Reviewed: Nursing Notes and Old Medical Records    Provider Notes (Medical Decision Making):   DDx: food bolus, esophageal spasm, esophageal stricture, GERD    25yoF with no PMH persents with chest pain. Consistent with prevbious episodes likely in esophagus, worse with drinking water. Vitals are stable and patient appears comfortable. No crepitus and CXR shows no signs of esophageal perforation. Patient tolerated soda and notes swallowing is easier, still with mild discomfort. Will treat with GI cocktail. Patient needs scope in future. Will provide discharge instructions and pepcid. ED Course and Progress Notes:   Initial assessment performed. The patients presenting problems have been discussed, and they are in agreement with the care plan formulated and outlined with them. I have encouraged them to ask questions as they arise throughout their visit. Orders Placed This Encounter    XR CHEST PORT    EKG, 12 LEAD, INITIAL    DISCONTD: sod bicarb-citric ac-simeth (EZ GAS II) 2.21-1.53 gram/4 gram contrast packet 4 g    maalox/viscous lidocaine (COV GI COCKTAIL)    famotidine (Pepcid) 20 mg tablet     Medications   maalox/viscous lidocaine (COV GI COCKTAIL) (40 mL Oral Given 9/21/20 1335)       Progress Note:  Pt states she feels much better after ED treatment; pt able to tolerate PO and ambulate per baseline. Pt is clinically safe for discharge. Ivan Laughlin's final labs and imaging have been reviewed with her. She has been counseled regarding her diagnosis. She verbally conveys understanding and agreement of the signs, symptoms, diagnosis, treatment and prognosis and additionally agrees to follow up as recommended with Dr. Soledad Bush MD in 24 - 48 hours. All questions have been answered, pt voiced understanding and agreement with plan. Specific return precautions provided as well as instructions to return to the ED should sx worsen at any time. At time of discharge, pt had stable vital signs and had no questions or concerns, and was very satisfied with overall care. Diagnosis     Clinical Impression:   1. Esophageal pain    2. Atypical chest pain    3. Gastroesophageal reflux disease without esophagitis    4. Gastritis and duodenitis        Disposition: Home  The pt is ready for discharge. The pt's signs, symptoms, diagnosis, and discharge instructions have been discussed and pt has conveyed their understanding. The pt is to follow up as recommended or return to ER should their symptoms worsen. Plan has been discussed and pt is in agreement. DISCHARGE PLAN:    1. Current Discharge Medication List      START taking these medications    Details   famotidine (Pepcid) 20 mg tablet Take 1 Tab by mouth two (2) times a day for 10 days. Qty: 20 Tab, Refills: 0           2. Follow-up Information     Follow up With Specialties Details Why Contact Info    Germain Snellen, MD Internal Medicine In 1 week  [address]  [address]  [phone]      [facility] EMERGENCY DEPT Emergency Medicine  If symptoms worsen [address] [facility]  [address]   [address] [address]  [phone]    [facility]  In 1 week  [address]  [facility] [address]   [address] [address]        3. Return to ED if worse     I personally performed the services described in this documentation on this date 9/21/2020 for Karyle Seeds. I have reviewed and verified that all the information is accurate and complete. Remberto Rios MD    This note will not be viewable in [address]

## 2020-11-12 ENCOUNTER — VIRTUAL VISIT (OUTPATIENT)
Dept: INTERNAL MEDICINE CLINIC | Age: 25
End: 2020-11-12
Payer: MEDICAID

## 2020-11-12 DIAGNOSIS — N94.6 MENSTRUAL CRAMPS: Primary | ICD-10-CM

## 2020-11-12 DIAGNOSIS — Z00.00 ROUTINE GENERAL MEDICAL EXAMINATION AT A HEALTH CARE FACILITY: ICD-10-CM

## 2020-11-12 DIAGNOSIS — G43.809 OTHER MIGRAINE WITHOUT STATUS MIGRAINOSUS, NOT INTRACTABLE: ICD-10-CM

## 2020-11-12 DIAGNOSIS — F32.1 MAJOR DEPRESSIVE DISORDER, SINGLE EPISODE, MODERATE WITH ANXIOUS DISTRESS (HCC): ICD-10-CM

## 2020-11-12 DIAGNOSIS — E55.9 VITAMIN D DEFICIENCY: ICD-10-CM

## 2020-11-12 PROCEDURE — 99214 OFFICE O/P EST MOD 30 MIN: CPT | Performed by: INTERNAL MEDICINE

## 2020-11-12 RX ORDER — RIZATRIPTAN BENZOATE 5 MG/1
TABLET, ORALLY DISINTEGRATING ORAL
COMMUNITY
Start: 2020-10-23 | End: 2021-12-14 | Stop reason: ALTCHOICE

## 2020-11-12 RX ORDER — INDOMETHACIN 25 MG/1
25 CAPSULE ORAL
Qty: 30 CAP | Refills: 1 | Status: SHIPPED | OUTPATIENT
Start: 2020-11-12 | End: 2020-11-13

## 2020-11-12 RX ORDER — FAMOTIDINE 20 MG/1
TABLET, FILM COATED ORAL
COMMUNITY
Start: 2020-10-05 | End: 2021-12-14 | Stop reason: SDUPTHER

## 2020-11-12 NOTE — PROGRESS NOTES
Karo De La Rosa is a 23 y.o. female who was seen by synchronous (real-time) audio-video technology on 11/12/2020 for Depression; Behavioral Problem; and Medication Refill        Assessment & Plan:   Diagnoses and all orders for this visit:    1. Menstrual cramps    Heating pad and massage. Will give,  -     indomethacin (INDOCIN) 25 mg capsule; Take 1 Cap by mouth two (2) times daily as needed for Pain for up to 90 days. 2. Major depressive disorder, single episode, moderate with anxious distress [facility])  Seeing psychiatrist, feeling okay. Not on medicine. 3. Vitamin D deficiency    Finished up vitamin D supplement. Will repeat,  -     VITAMIN D, 25 HYDROXY    4. Routine general medical examination at a health care facility    Doing well. Will check,  -     METABOLIC PANEL, COMPREHENSIVE  -     CBC WITH AUTOMATED DIFF    5. Other migraine without status migrainosus, not intractable    Not having frequent migraine attack. Doing well. I spent at least 25 minutes on this visit with this established patient. Subjective:     Ms. Shakira Sadler is here for follow-up. Report her menstrual cramp in every cycle. She is worried if she has endometriosis since she has family history of endometriosis. She is not sexually active. Not too heavy menses. She suffers from migraine, but lately she is not having lot of attack. Is Maxalt as needed. Had depression, doing well now. Seeing psychiatrist.  Had low vitamin D, finished up supplement. Need lab work. Need flu shot. Prior to Admission medications    Medication Sig Start Date End Date Taking? Authorizing Provider   indomethacin (INDOCIN) 25 mg capsule Take 1 Cap by mouth two (2) times daily as needed for Pain for up to 90 days. 11/12/20 2/10/21 Yes Ruy Root MD   albuterol (PROVENTIL HFA, VENTOLIN HFA, PROAIR HFA) 90 mcg/actuation inhaler Take 1 Puff by inhalation every six (6) hours as needed for Wheezing.  6/16/20  Yes Ruy Root MD   rizatriptemi (MAXALT-MLT) 5 mg rapid dissolve tablet TAKE 1 TABLET BY MOUTH AS ONE DOSE 10/23/20   Provider, Historical   famotidine (PEPCID) 20 mg tablet TAKE 1 TABLET BY MOUTH TWICE A DAY 10/5/20   Provider, Historical     Past Medical History:   Diagnosis Date    ADHD     Asthma     Depression     Headache     Psychotic disorder ([facility] [address]        ROS significant for menstrual cramp    Objective:     Patient-Reported Vitals 11/12/2020   Patient-Reported LMP 11/4/2020        Constitutional: [x] Appears well-developed and well-nourished [x] No apparent distress      [] Abnormal -     Mental status: [x] Alert and awake  [x] Oriented to person/place/time [x] Able to follow commands    [] Abnormal -     HENT: [x] Normocephalic, atraumatic  [] Abnormal -   [x] Mouth/Throat: Mucous membranes are moist    External Ears [x] Normal  [] Abnormal -    Neck: [x] No visualized mass [] Abnormal -     Pulmonary/Chest: [x] Respiratory effort normal   [x] No visualized signs of difficulty breathing or respiratory distress        [] Abnormal -      Musculoskeletal:   [x] Normal gait with no signs of ataxia         [x] Normal range of motion of neck        [] Abnormal -     Neurological:        [x] No Facial Asymmetry (Cranial nerve 7 motor function) (limited exam due to video visit)          [x] No gaze palsy        [] Abnormal -                   Psychiatric:       [x] Normal Affect [] Abnormal -        [x] No Hallucinations    Other pertinent observable physical exam findings:-        We discussed the expected course, resolution and complications of the diagnosis(es) in detail. Medication risks, benefits, costs, interactions, and alternatives were discussed as indicated. I advised her to contact the office if her condition worsens, changes or fails to improve as anticipated. She expressed understanding with the diagnosis(es) and plan.        Mary Mercer, who was evaluated through a patient-initiated, synchronous (real-time) audio-video encounter, and/or her healthcare decision maker, is aware that it is a billable service, with coverage as determined by her insurance carrier. She provided verbal consent to proceed: Yes, and patient identification was verified. It was conducted pursuant to the emergency declaration under the [address] [place] [address], [address] [place] [address] authority and the [employer] [employer] General Act. A caregiver was present when appropriate. Ability to conduct physical exam was limited. I was in the office. The patient was at home.       Bailee Calhoun MD

## 2020-11-12 NOTE — PROGRESS NOTES
Health Maintenance Due   Topic Date Due    HPV Age 9Y-34Y (1 - 2-dose series) 05/01/2008    DTaP/Tdap/Td series (1 - Tdap) 05/01/2018    PAP AKA CERVICAL CYTOLOGY  05/01/2018    Flu Vaccine (1) 09/01/2020       Chief Complaint   Patient presents with    Depression    Behavioral Problem    Medication Refill       1. Have you been to the ER, urgent care clinic since your last visit? Hospitalized since your last visit? No    2. Have you seen or consulted any other health care providers outside of the [address] [place] [address] since your last visit? Include any pap smears or colon screening. No    3) Do you have an Advance Directive on file? no    4) Are you interested in receiving information on Advance Directives? NO      Patient is accompanied by self I have received verbal consent from Chriss Morin to discuss any/all medical information while they are present in the room.

## 2020-11-13 RX ORDER — NAPROXEN 500 MG/1
500 TABLET ORAL
Qty: 30 TAB | Refills: 1 | Status: SHIPPED | OUTPATIENT
Start: 2020-11-13 | End: 2021-01-19

## 2020-11-19 DIAGNOSIS — Z86.59 HISTORY OF ATTENTION DEFICIT HYPERACTIVITY DISORDER (ADHD): Primary | ICD-10-CM

## 2020-11-19 RX ORDER — DEXTROAMPHETAMINE SACCHARATE, AMPHETAMINE ASPARTATE, DEXTROAMPHETAMINE SULFATE AND AMPHETAMINE SULFATE 2.5; 2.5; 2.5; 2.5 MG/1; MG/1; MG/1; MG/1
10 TABLET ORAL 2 TIMES DAILY
Qty: 60 TAB | Refills: 0 | Status: SHIPPED | OUTPATIENT
Start: 2020-11-19 | End: 2021-12-14 | Stop reason: SDUPTHER

## 2020-11-25 ENCOUNTER — TELEPHONE (OUTPATIENT)
Dept: INTERNAL MEDICINE CLINIC | Age: 25
End: 2020-11-25

## 2020-11-25 NOTE — TELEPHONE ENCOUNTER
----- Message from Danna Cortes sent at 11/25/2020  9:56 AM EST -----  Regarding: Dr. Venancio Madden Message/Vendor Calls    Caller's first and last name: Self       Reason for call: [phone]      Callback required yes/no and why: Yes       Best contact number(s): [phone]      Details to clarify the request: Patient would like a status update on the pre auth for her prescription for \"Dextroamphetamine\", as she has not yet been able to fill this medicaiton.  Please contact patient at [address],[place] [address]

## 2020-12-02 NOTE — TELEPHONE ENCOUNTER
Patient called again to check the status of the prior auth for her medication. Please call her back at [phone]

## 2020-12-03 ENCOUNTER — DOCUMENTATION ONLY (OUTPATIENT)
Dept: INTERNAL MEDICINE CLINIC | Age: 25
End: 2020-12-03

## 2020-12-03 NOTE — PROGRESS NOTES
Approvedon December 2  PA Case: [id number], Status: Approved, Coverage Starts on: 12/2/2020 12:00:00 AM, Coverage Ends on: 12/2/2021 12:00:00 AM

## 2021-01-19 RX ORDER — NAPROXEN 500 MG/1
TABLET ORAL
Qty: 30 TAB | Refills: 0 | Status: SHIPPED | OUTPATIENT
Start: 2021-01-19 | End: 2021-03-29

## 2021-01-22 ENCOUNTER — VIRTUAL VISIT (OUTPATIENT)
Dept: INTERNAL MEDICINE CLINIC | Age: 26
End: 2021-01-22
Payer: MEDICAID

## 2021-01-22 DIAGNOSIS — R44.8 FEELS COLD: ICD-10-CM

## 2021-01-22 DIAGNOSIS — Z13.220 LIPID SCREENING: ICD-10-CM

## 2021-01-22 DIAGNOSIS — R53.83 FATIGUE, UNSPECIFIED TYPE: ICD-10-CM

## 2021-01-22 DIAGNOSIS — N92.0 MENORRHAGIA WITH REGULAR CYCLE: ICD-10-CM

## 2021-01-22 DIAGNOSIS — Z13.21 ENCOUNTER FOR VITAMIN DEFICIENCY SCREENING: ICD-10-CM

## 2021-01-22 DIAGNOSIS — J45.909 ASTHMA, UNSPECIFIED ASTHMA SEVERITY, UNSPECIFIED WHETHER COMPLICATED, UNSPECIFIED WHETHER PERSISTENT: Primary | ICD-10-CM

## 2021-01-22 PROCEDURE — 99442 PR PHYS/QHP TELEPHONE EVALUATION 11-20 MIN: CPT | Performed by: NURSE PRACTITIONER

## 2021-01-22 RX ORDER — NEBULIZER AND COMPRESSOR
EACH MISCELLANEOUS
Qty: 1 EACH | Refills: 0 | Status: SHIPPED | OUTPATIENT
Start: 2021-01-22

## 2021-01-22 RX ORDER — ALBUTEROL SULFATE 0.83 MG/ML
2.5 SOLUTION RESPIRATORY (INHALATION)
Qty: 30 NEBULE | Refills: 1 | Status: SHIPPED | OUTPATIENT
Start: 2021-01-22 | End: 2021-10-27 | Stop reason: SDUPTHER

## 2021-01-22 NOTE — PROGRESS NOTES
ADVISED PATIENT OF THE FOLLOWING HEALTH MAINTAINCE DUE  Health Maintenance Due   Topic Date Due    HPV Age 9Y-34Y (1 - 2-dose series) 05/01/2008    DTaP/Tdap/Td series (1 - Tdap) 05/01/2018    PAP AKA CERVICAL CYTOLOGY  05/01/2018    Flu Vaccine (1) 09/01/2020      Chief Complaint   Patient presents with    Anemia     low iron , wants labs     Behavioral Problem    Depression       1. Have you been to the ER, urgent care clinic since your last visit? Hospitalized since your last visit? No    2. Have you seen or consulted any other health care providers outside of the [address] [place] [address] since your last visit? Include any DEXA scan, mammography  or colon screening. No    3. Do you have an Advance Directive on file? no    4. Do you have a DNR on file? no    Patient is accompanied by self I have received verbal consent from Spencer Blanco to discuss any/all medical information while they are present in the room. No flowsheet data found. [id number] [place] - [address] [place] [address]  Phone: [phone] Fax: [phone]    [facility]  - [facility], [address] [place] [address] [address] [facility] [address] [address]  Phone: [phone] Fax: [phone]    [place], [place] - [address] [place] [address] [address] [address]  Phone: [phone] Fax: [phone]        Patient reminded during visit to bring all medication bottles, OTC medications to all appointments.

## 2021-01-22 NOTE — PROGRESS NOTES
Jan Barriga is a 23 y.o. female, evaluated via audio-only technology on 1/22/2021 for Anemia (low iron , wants labs ), Behavioral Problem, and Depression  . Assessment & Plan:   Diagnoses and all orders for this visit:    1. Asthma, unspecified asthma severity, unspecified whether complicated, unspecified whether persistent  Will order  -     Nebulizer & Compressor machine; Use as directed  -     Nebulizer Accessories kit; Use as directed  -     albuterol (PROVENTIL VENTOLIN) 2.5 mg /3 mL (0.083 %) nebu; 3 mL by Nebulization route every six (6) hours as needed for Wheezing. If experiencing severe shortness of breath or chest pain, present to the ER. 2. Fatigue, unspecified type  Will order  -     CBC WITH AUTOMATED DIFF; Future  -     METABOLIC PANEL, COMPREHENSIVE; Future  -     TSH 3RD GENERATION; Future    3. Feels cold  As above,  -     CBC WITH AUTOMATED DIFF; Future  -     METABOLIC PANEL, COMPREHENSIVE; Future  -     TSH 3RD GENERATION; Future    4. Menorrhagia with regular cycle  As above,  -     CBC WITH AUTOMATED DIFF; Future    5. Encounter for vitamin deficiency screening  Will order  -     VITAMIN D, 25 HYDROXY; Future    6. Lipid screening  Will order  -     LIPID PANEL; Future    Patient encouraged to call or return to office if symptoms do not improve or worsen. Reviewed medications. Reviewed plan of care with patient who acknowledges understanding and agrees. 12  Subjective: This is a patient of Dr. Guru Vogt who presents today with concerns for anemia. The patient states she has been having more heavy menstrual cycles recently. They are regular and last about 4 days. She has been frequently feeling fatigued, even when able to sleep well at night and also often feels cold. She is concerned about anemia. The patient also mentions she has a history of asthma. She uses albuterol inhaler PRN for wheezing. She has intermittent mild dry cough as well.   She states in the past she used nebulizer PRN as well and would like to resume as needed, as it was effective. Prior to Admission medications    Medication Sig Start Date End Date Taking? Authorizing Provider   naproxen (NAPROSYN) 500 mg tablet Take 1 tablet by mouth twice daily as needed for pain 1/19/21  Yes Keri Randolph NP   dextroamphetamine-amphetamine (ADDERALL) 10 mg tablet Take 1 Tab by mouth two (2) times a day. Max Daily Amount: 20 mg. 11/19/20  Yes Melanie Turner MD   rizatriptan (MAXALT-MLT) 5 mg rapid dissolve tablet TAKE 1 TABLET BY MOUTH AS ONE DOSE 10/23/20  Yes Provider, Historical   famotidine (PEPCID) 20 mg tablet TAKE 1 TABLET BY MOUTH TWICE A DAY 10/5/20  Yes Provider, Historical   albuterol (PROVENTIL HFA, VENTOLIN HFA, PROAIR HFA) 90 mcg/actuation inhaler Take 1 Puff by inhalation every six (6) hours as needed for Wheezing. 6/16/20  Yes Melanie Turner MD     No Known Allergies  Past Medical History:   Diagnosis Date    ADHD     Asthma     Depression     Headache     Psychotic disorder ([facility] [address]      History reviewed. No pertinent surgical history. Review of Systems   Constitutional: Positive for malaise/fatigue. Negative for chills and fever. HENT: Negative. Respiratory: Positive for cough and wheezing. Negative for hemoptysis, sputum production and shortness of breath. Cardiovascular: Negative. Gastrointestinal: Negative. Genitourinary: Negative. Musculoskeletal: Negative. Skin: Negative. Neurological: Negative. Endo/Heme/Allergies: Negative. Psychiatric/Behavioral: Negative. Patient-Reported Vitals 11/12/2020   Patient-Reported LMP 11/4/2020        Mynor Alvares, who was evaluated through a patient-initiated, synchronous (real-time) audio only encounter, and/or her healthcare decision maker, is aware that it is a billable service, with coverage as determined by her insurance carrier. She provided verbal consent to proceed: Yes.  She has not had a related appointment within my department in the past 7 days or scheduled within the next 24 hours.       Total Time: minutes: 11-20 minutes    Sarita Stewart NP

## 2021-03-29 RX ORDER — NAPROXEN 500 MG/1
TABLET ORAL
Qty: 30 TAB | Refills: 0 | Status: SHIPPED | OUTPATIENT
Start: 2021-03-29 | End: 2021-05-19 | Stop reason: SDUPTHER

## 2021-04-13 ENCOUNTER — VIRTUAL VISIT (OUTPATIENT)
Dept: INTERNAL MEDICINE CLINIC | Age: 26
End: 2021-04-13
Payer: MEDICAID

## 2021-04-13 DIAGNOSIS — R35.0 FREQUENCY OF URINATION: Primary | ICD-10-CM

## 2021-04-13 DIAGNOSIS — R10.9 STOMACH PAIN: ICD-10-CM

## 2021-04-13 PROCEDURE — 99442 PR PHYS/QHP TELEPHONE EVALUATION 11-20 MIN: CPT | Performed by: INTERNAL MEDICINE

## 2021-04-13 NOTE — PROGRESS NOTES
Melquiades Salazar is a 23 y.o. female, evaluated via audio-only technology on 4/13/2021 for Abdominal Pain and Polyuria  . Assessment & Plan:   Diagnoses and all orders for this visit:    1. Frequency of urination  -     URINALYSIS W/MICROSCOPIC; Future    2. Stomach pain  -     URINALYSIS W/MICROSCOPIC; Future      Patient would like to get UA prior to abx treatment. Reports that she cannot go until next week. 12  Subjective:     Reports some lower stomach pain for the last 3-4 days. Has had a few BM and frequency urination. Reports some tenderness to the mid section of stomach. Reports that she has only taken NSAIDS with some relief. Reports that she began her period this week as will. No odor, discharge or pain. Last UTI was a year ago     Prior to Admission medications    Medication Sig Start Date End Date Taking? Authorizing Provider   naproxen (NAPROSYN) 500 mg tablet Take 1 tablet by mouth twice daily as needed for pain 3/29/21  Yes Archana Randolph NP   Nebulizer & Compressor machine Use as directed 1/22/21  Yes Perfecto Wilkinson NP   Nebulizer Accessories kit Use as directed 1/22/21  Yes Perfecto Wilkinson NP   albuterol (PROVENTIL VENTOLIN) 2.5 mg /3 mL (0.083 %) nebu 3 mL by Nebulization route every six (6) hours as needed for Wheezing. 1/22/21  Yes Perfecto Wilkinson NP   dextroamphetamine-amphetamine (ADDERALL) 10 mg tablet Take 1 Tab by mouth two (2) times a day. Max Daily Amount: 20 mg. 11/19/20  Yes Rikki Guerrero MD   rizatriptan (MAXALT-MLT) 5 mg rapid dissolve tablet TAKE 1 TABLET BY MOUTH AS ONE DOSE 10/23/20  Yes Provider, Historical   famotidine (PEPCID) 20 mg tablet TAKE 1 TABLET BY MOUTH TWICE A DAY 10/5/20  Yes Provider, Historical   albuterol (PROVENTIL HFA, VENTOLIN HFA, PROAIR HFA) 90 mcg/actuation inhaler Take 1 Puff by inhalation every six (6) hours as needed for Wheezing.  6/16/20  Yes Rikki Guerrero MD     Patient Active Problem List   Diagnosis Code    History of attention deficit hyperactivity disorder (ADHD) Z86.59    Major depressive disorder, single episode, moderate with anxious distress ([facility] [address] F32.1     Patient Active Problem List    Diagnosis Date Noted    History of attention deficit hyperactivity disorder (ADHD) 09/06/2019    Major depressive disorder, single episode, moderate with anxious distress (HCC) 09/06/2019     Current Outpatient Medications   Medication Sig Dispense Refill    naproxen (NAPROSYN) 500 mg tablet Take 1 tablet by mouth twice daily as needed for pain 30 Tab 0    Nebulizer & Compressor machine Use as directed 1 Each 0    Nebulizer Accessories kit Use as directed 1 Kit 0    albuterol (PROVENTIL VENTOLIN) 2.5 mg /3 mL (0.083 %) nebu 3 mL by Nebulization route every six (6) hours as needed for Wheezing. 30 Nebule 1    dextroamphetamine-amphetamine (ADDERALL) 10 mg tablet Take 1 Tab by mouth two (2) times a day. Max Daily Amount: 20 mg. 60 Tab 0    rizatriptan (MAXALT-MLT) 5 mg rapid dissolve tablet TAKE 1 TABLET BY MOUTH AS ONE DOSE      famotidine (PEPCID) 20 mg tablet TAKE 1 TABLET BY MOUTH TWICE A DAY      albuterol (PROVENTIL HFA, VENTOLIN HFA, PROAIR HFA) 90 mcg/actuation inhaler Take 1 Puff by inhalation every six (6) hours as needed for Wheezing. 1 Inhaler 3     No Known Allergies  Past Medical History:   Diagnosis Date    ADHD     Asthma     Depression     Headache     Psychotic disorder (HCC)        Review of Systems   Constitutional: Negative. Respiratory: Negative. Cardiovascular: Negative. Gastrointestinal: Positive for abdominal pain. Genitourinary: Positive for frequency. Negative for dysuria. Neurological: Negative.         Patient-Reported Vitals 4/13/2021   Patient-Reported Weight 110 lbs   Patient-Reported LMP 4/12/21        Summer Falk, who was evaluated through a patient-initiated, synchronous (real-time) audio only encounter, and/or her healthcare decision maker, is aware that it is a billable service, with coverage as determined by her insurance carrier. She provided verbal consent to proceed: Yes. She has not had a related appointment within my department in the past 7 days or scheduled within the next 24 hours.       Total Time: minutes: 11-20 minutes    Anupama Graham NP

## 2021-04-13 NOTE — PROGRESS NOTES
Health Maintenance Due   Topic Date Due    Hepatitis C Screening  Never done    HPV Age 9Y-34Y (1 - 2-dose series) Never done    DTaP/Tdap/Td series (1 - Tdap) Never done    PAP AKA CERVICAL CYTOLOGY  Never done       Chief Complaint   Patient presents with    Abdominal Pain    Polyuria       1. Have you been to the ER, urgent care clinic since your last visit? Hospitalized since your last visit? No    2. Have you seen or consulted any other health care providers outside of the [address] [place] [address] since your last visit? Include any pap smears or colon screening. No    3) Do you have an Advance Directive on file? no    4) Are you interested in receiving information on Advance Directives? NO      Patient is accompanied by self I have received verbal consent from Melquiades Salazar to discuss any/all medical information while they are present in the room.

## 2021-04-20 ENCOUNTER — TRANSCRIBE ORDER (OUTPATIENT)
Dept: INTERNAL MEDICINE CLINIC | Age: 26
End: 2021-04-20

## 2021-04-21 ENCOUNTER — TELEPHONE (OUTPATIENT)
Dept: INTERNAL MEDICINE CLINIC | Age: 26
End: 2021-04-21

## 2021-04-21 DIAGNOSIS — R35.0 FREQUENCY OF URINATION: Primary | ICD-10-CM

## 2021-04-21 DIAGNOSIS — E55.9 VITAMIN D DEFICIENCY: ICD-10-CM

## 2021-04-21 LAB
25(OH)D3+25(OH)D2 SERPL-MCNC: 13 NG/ML (ref 30–100)
ALBUMIN SERPL-MCNC: 4.3 G/DL (ref 3.9–5)
ALBUMIN/GLOB SERPL: 1.6 {RATIO} (ref 1.2–2.2)
ALP SERPL-CCNC: 47 IU/L (ref 39–117)
ALT SERPL-CCNC: 11 IU/L (ref 0–32)
APPEARANCE UR: CLEAR
AST SERPL-CCNC: 19 IU/L (ref 0–40)
BACTERIA #/AREA URNS HPF: ABNORMAL /[HPF]
BASOPHILS # BLD AUTO: 0 X10E3/UL (ref 0–0.2)
BASOPHILS NFR BLD AUTO: 0 %
BILIRUB SERPL-MCNC: 0.3 MG/DL (ref 0–1.2)
BILIRUB UR QL STRIP: NEGATIVE
BUN SERPL-MCNC: 13 MG/DL (ref 6–20)
BUN/CREAT SERPL: 17 (ref 9–23)
CALCIUM SERPL-MCNC: 9.5 MG/DL (ref 8.7–10.2)
CASTS URNS QL MICRO: ABNORMAL /LPF
CHLORIDE SERPL-SCNC: 106 MMOL/L (ref 96–106)
CO2 SERPL-SCNC: 23 MMOL/L (ref 20–29)
COLOR UR: YELLOW
CREAT SERPL-MCNC: 0.76 MG/DL (ref 0.57–1)
EOSINOPHIL # BLD AUTO: 0.1 X10E3/UL (ref 0–0.4)
EOSINOPHIL NFR BLD AUTO: 3 %
EPI CELLS #/AREA URNS HPF: ABNORMAL /HPF (ref 0–10)
ERYTHROCYTE [DISTWIDTH] IN BLOOD BY AUTOMATED COUNT: 11.8 % (ref 11.7–15.4)
GLOBULIN SER CALC-MCNC: 2.7 G/DL (ref 1.5–4.5)
GLUCOSE SERPL-MCNC: 88 MG/DL (ref 65–99)
GLUCOSE UR QL: NEGATIVE
HCT VFR BLD AUTO: 36.2 % (ref 34–46.6)
HGB BLD-MCNC: 12 G/DL (ref 11.1–15.9)
HGB UR QL STRIP: NEGATIVE
IMM GRANULOCYTES # BLD AUTO: 0 X10E3/UL (ref 0–0.1)
IMM GRANULOCYTES NFR BLD AUTO: 0 %
KETONES UR QL STRIP: NEGATIVE
LEUKOCYTE ESTERASE UR QL STRIP: NEGATIVE
LYMPHOCYTES # BLD AUTO: 2.6 X10E3/UL (ref 0.7–3.1)
LYMPHOCYTES NFR BLD AUTO: 47 %
MCH RBC QN AUTO: 30.6 PG (ref 26.6–33)
MCHC RBC AUTO-ENTMCNC: 33.1 G/DL (ref 31.5–35.7)
MCV RBC AUTO: 92 FL (ref 79–97)
MICRO URNS: NORMAL
MICRO URNS: NORMAL
MONOCYTES # BLD AUTO: 0.5 X10E3/UL (ref 0.1–0.9)
MONOCYTES NFR BLD AUTO: 9 %
NEUTROPHILS # BLD AUTO: 2.3 X10E3/UL (ref 1.4–7)
NEUTROPHILS NFR BLD AUTO: 41 %
NITRITE UR QL STRIP: NEGATIVE
PH UR STRIP: 6.5 [PH] (ref 5–7.5)
PLATELET # BLD AUTO: 259 X10E3/UL (ref 150–450)
POTASSIUM SERPL-SCNC: 3.9 MMOL/L (ref 3.5–5.2)
PROT SERPL-MCNC: 7 G/DL (ref 6–8.5)
PROT UR QL STRIP: NEGATIVE
RBC # BLD AUTO: 3.92 X10E6/UL (ref 3.77–5.28)
RBC #/AREA URNS HPF: ABNORMAL /HPF (ref 0–2)
SODIUM SERPL-SCNC: 142 MMOL/L (ref 134–144)
SP GR UR: 1.02 (ref 1–1.03)
UROBILINOGEN UR STRIP-MCNC: 0.2 MG/DL (ref 0.2–1)
WBC # BLD AUTO: 5.7 X10E3/UL (ref 3.4–10.8)
WBC #/AREA URNS HPF: ABNORMAL /HPF (ref 0–5)

## 2021-04-21 RX ORDER — NITROFURANTOIN 25; 75 MG/1; MG/1
100 CAPSULE ORAL 2 TIMES DAILY
Qty: 10 CAP | Refills: 0 | Status: SHIPPED | OUTPATIENT
Start: 2021-04-21 | End: 2021-04-21 | Stop reason: ALTCHOICE

## 2021-04-21 RX ORDER — CHOLECALCIFEROL (VITAMIN D3) 12.5 MCG/5
LIQUID (ML) ORAL
Qty: 480 ML | Refills: 1 | Status: SHIPPED | OUTPATIENT
Start: 2021-04-21 | End: 2021-10-27 | Stop reason: SDUPTHER

## 2021-04-21 RX ORDER — ERGOCALCIFEROL 1.25 MG/1
50000 CAPSULE ORAL
Qty: 12 CAP | Refills: 0 | Status: SHIPPED | OUTPATIENT
Start: 2021-04-21 | End: 2021-04-21 | Stop reason: SDUPTHER

## 2021-04-21 RX ORDER — AMOXICILLIN AND CLAVULANATE POTASSIUM 500; 125 MG/1; MG/1
1 TABLET, FILM COATED ORAL 2 TIMES DAILY
Qty: 10 TAB | Refills: 0 | Status: SHIPPED | OUTPATIENT
Start: 2021-04-21 | End: 2021-05-19 | Stop reason: ALTCHOICE

## 2021-04-21 NOTE — TELEPHONE ENCOUNTER
Called and spoke with pt, She states the stomach pains are gfone. She is still have urinary frequency. She asked about her labs that she had done. I reviewed those with her letting her know her Vitamin D was low. And I will send provider recommendations to her via my chart message. She verbalized understanding.

## 2021-04-21 NOTE — TELEPHONE ENCOUNTER
----- Message from Celine Saunders NP sent at 4/21/2021  8:24 AM EDT -----  How is she feeling?  UA negative

## 2021-04-22 ENCOUNTER — TELEPHONE (OUTPATIENT)
Dept: INTERNAL MEDICINE CLINIC | Age: 26
End: 2021-04-22

## 2021-04-22 NOTE — TELEPHONE ENCOUNTER
Call to pharmacy and clarification provided, only available for order is 400 units per 1 ml, pharmacy to switch to equivalent per provider

## 2021-05-12 DIAGNOSIS — R06.2 WHEEZING: ICD-10-CM

## 2021-05-12 DIAGNOSIS — R52 PAIN: Primary | ICD-10-CM

## 2021-05-12 RX ORDER — ALBUTEROL SULFATE 90 UG/1
1 AEROSOL, METERED RESPIRATORY (INHALATION)
Qty: 1 INHALER | Refills: 3 | OUTPATIENT
Start: 2021-05-12

## 2021-05-12 RX ORDER — NAPROXEN 500 MG/1
TABLET ORAL
Qty: 30 TAB | Refills: 0 | OUTPATIENT
Start: 2021-05-12

## 2021-05-19 ENCOUNTER — VIRTUAL VISIT (OUTPATIENT)
Dept: INTERNAL MEDICINE CLINIC | Age: 26
End: 2021-05-19
Payer: MEDICAID

## 2021-05-19 DIAGNOSIS — F90.9 ATTENTION DEFICIT HYPERACTIVITY DISORDER (ADHD), UNSPECIFIED ADHD TYPE: ICD-10-CM

## 2021-05-19 DIAGNOSIS — N94.6 MENSTRUAL CRAMP: ICD-10-CM

## 2021-05-19 DIAGNOSIS — R06.2 WHEEZING: ICD-10-CM

## 2021-05-19 DIAGNOSIS — J45.909 ASTHMA, UNSPECIFIED ASTHMA SEVERITY, UNSPECIFIED WHETHER COMPLICATED, UNSPECIFIED WHETHER PERSISTENT: Primary | ICD-10-CM

## 2021-05-19 DIAGNOSIS — E55.9 VITAMIN D DEFICIENCY: ICD-10-CM

## 2021-05-19 PROCEDURE — 99214 OFFICE O/P EST MOD 30 MIN: CPT | Performed by: INTERNAL MEDICINE

## 2021-05-19 RX ORDER — NAPROXEN 500 MG/1
500 TABLET ORAL
Qty: 30 TABLET | Refills: 0 | Status: SHIPPED | OUTPATIENT
Start: 2021-05-19 | End: 2021-08-19

## 2021-05-19 RX ORDER — ALBUTEROL SULFATE 90 UG/1
1 AEROSOL, METERED RESPIRATORY (INHALATION)
Qty: 1 INHALER | Refills: 5 | Status: SHIPPED | OUTPATIENT
Start: 2021-05-19 | End: 2021-10-27 | Stop reason: SDUPTHER

## 2021-05-19 NOTE — PROGRESS NOTES
Ramón Acevedo is a 27 y.o. female who was seen by synchronous (real-time) audio-video technology on 5/19/2021 for Medication Refill, Wheezing, and Pain (Chronic)        Assessment & Plan:   Diagnoses and all orders for this visit:    1. Asthma, unspecified asthma severity, unspecified whether complicated, unspecified whether persistent      Mild asthma, only an inhaler is helping her with wheezing. No need for steroid. 2. Wheezing    We will refill,  -     albuterol (PROVENTIL HFA, VENTOLIN HFA, PROAIR HFA) 90 mcg/actuation inhaler; Take 1 Puff by inhalation every six (6) hours as needed for Wheezing. 3. Attention deficit hyperactivity disorder (ADHD), unspecified ADHD type    She was diagnosed with ADHD many years back. Still having attention deficit. Was on Adderall in the past.  Need to get reevaluated. Will refer,  -     REFERRAL TO PSYCHIATRY    4. Menstrual cramp    Heating pad and massage. Will give,  -     naproxen (NAPROSYN) 500 mg tablet; Take 1 Tablet by mouth two (2) times daily as needed for Pain. 5. Vitamin D deficiency  Labs done recently. Your vitamin D level is very low. She is taking liquid vitamin D. We will continue it for now. I spent at least 30 minutes on this visit with this established patient. Subjective:     Ms. Sarah Pang is here for follow-up. She suffers from asthma and wheezing. Using inhaler nebulizer. Need rescue inhaler refill. No cough. She is using inhaler not more than 3 times a week. Suffers from menstrual cramp, naproxen helps her. Would like to get a refill. She was diagnosed with ADHD in a young age. Not taking Adderall for long time. She has trouble focusing. Would like to see psychiatrist.  Recent lab work done, all discussed with her. She has very low vitamin D, start taking vitamin D liquid medication. Not sexually active. Pap smear was not done. Did not receive a Covid vaccine, advised her to take it.   Prior to Admission medications Medication Sig Start Date End Date Taking? Authorizing Provider   albuterol (PROVENTIL HFA, VENTOLIN HFA, PROAIR HFA) 90 mcg/actuation inhaler Take 1 Puff by inhalation every six (6) hours as needed for Wheezing. 5/19/21  Yes Noel Galeana MD   naproxen (NAPROSYN) 500 mg tablet Take 1 Tablet by mouth two (2) times daily as needed for Pain. 5/19/21  Yes Noel Galeana MD   cholecalciferol, vitamin D3, 12.5 mcg/5 mL (500 unit/5 mL) liqd 2 tsp BID 4/21/21  Yes Brian Diaz NP   Nebulizer & Compressor machine Use as directed 1/22/21  Yes Brian Diaz NP   Nebulizer Accessories kit Use as directed 1/22/21  Yes Brian Diaz NP   albuterol (PROVENTIL VENTOLIN) 2.5 mg /3 mL (0.083 %) nebu 3 mL by Nebulization route every six (6) hours as needed for Wheezing. 1/22/21  Yes Brian Diaz NP   rizatriptan (MAXALT-MLT) 5 mg rapid dissolve tablet TAKE 1 TABLET BY MOUTH AS ONE DOSE 10/23/20  Yes Provider, Historical   famotidine (PEPCID) 20 mg tablet TAKE 1 TABLET BY MOUTH TWICE A DAY 10/5/20  Yes Provider, Historical   amoxicillin-clavulanate (AUGMENTIN) 500-125 mg per tablet Take 1 Tab by mouth two (2) times a day. 4/21/21 5/19/21  Joceline Robert NP   naproxen (NAPROSYN) 500 mg tablet Take 1 tablet by mouth twice daily as needed for pain 3/29/21 5/19/21  Joceline Robert NP   dextroamphetamine-amphetamine (ADDERALL) 10 mg tablet Take 1 Tab by mouth two (2) times a day. Max Daily Amount: 20 mg. Patient not taking: Reported on 5/19/2021 11/19/20   Noel Galeana MD   albuterol (PROVENTIL HFA, VENTOLIN HFA, PROAIR HFA) 90 mcg/actuation inhaler Take 1 Puff by inhalation every six (6) hours as needed for Wheezing. 6/16/20 5/19/21  Noel Galeana MD     Past Medical History:   Diagnosis Date    ADHD     Asthma     Depression     Headache     Psychotic disorder ([address]        ROS significant for wheezing.     Objective:     Patient-Reported Vitals 5/19/2021   Patient-Reported Weight 110   Patient-Reported Height 411   Patient-Reported LMP 5/13/2021        Constitutional: [x] Appears well-developed and well-nourished [x] No apparent distress      [] Abnormal -     Mental status: [x] Alert and awake  [x] Oriented to person/place/time [x] Able to follow commands    [] Abnormal -       HENT: [x] Normocephalic, atraumatic  [] Abnormal -   [x] Mouth/Throat: Mucous membranes are moist    External Ears [x] Normal  [] Abnormal -    Neck: [x] No visualized mass [] Abnormal -     Pulmonary/Chest: [x] Respiratory effort normal   [x] No visualized signs of difficulty breathing or respiratory distress        [] Abnormal -      Musculoskeletal:   [x] Normal gait with no signs of ataxia         [x] Normal range of motion of neck        [] Abnormal -     Neurological:        [x] No Facial Asymmetry (Cranial nerve 7 motor function) (limited exam due to video visit)          [x] No gaze palsy        [] Abnormal -          Skin:        [x] No significant exanthematous lesions or discoloration noted on facial skin         [] Abnormal -            Psychiatric:       [x] Normal Affect [] Abnormal -        [x] No Hallucinations    Other pertinent observable physical exam findings:-        We discussed the expected course, resolution and complications of the diagnosis(es) in detail. Medication risks, benefits, costs, interactions, and alternatives were discussed as indicated. I advised her to contact the office if her condition worsens, changes or fails to improve as anticipated. She expressed understanding with the diagnosis(es) and plan. Devan Fuentes, was evaluated through a synchronous (real-time) audio-video encounter. The patient (or guardian if applicable) is aware that this is a billable service. Verbal consent to proceed has been obtained within the past 12 months.  The visit was conducted pursuant to the emergency declaration under the [address] waiver authority and the Coronavirus Preparedness and Response Supplemental Appropriations Act. Patient identification was verified, and a caregiver was present when appropriate. The patient was located in a state where the provider was credentialed to provide care.       Keeley Lepe MD

## 2021-05-19 NOTE — PROGRESS NOTES
Results for orders placed or performed in visit on 04/13/21   URINALYSIS W/MICROSCOPIC   Result Value Ref Range    Specific Gravity 1.023 1.005 - 1.030    pH (UA) 6.5 5.0 - 7.5    Color Yellow Yellow    Appearance Clear Clear    Leukocyte Esterase Negative Negative    Protein Negative Negative/Trace    Glucose Negative Negative    Ketone Negative Negative    Blood Negative Negative    Bilirubin Negative Negative    Urobilinogen 0.2 0.2 - 1.0 mg/dL    Nitrites Negative Negative    Microscopic Examination Comment     Microscopic exam See additional order    MICROSCOPIC EXAMINATION   Result Value Ref Range    WBC None seen 0 - 5 /hpf    RBC 3-10 (A) 0 - 2 /hpf    Epithelial cells 0-10 0 - 10 /hpf    Casts None seen None seen /lpf    Bacteria None seen None seen/Few       Health Maintenance Due   Topic Date Due    Hepatitis C Screening  Never done    HPV Age 9Y-34Y (1 - 2-dose series) Never done    COVID-19 Vaccine (1) Never done    DTaP/Tdap/Td series (1 - Tdap) Never done    PAP AKA CERVICAL CYTOLOGY  Never done       No chief complaint on file. 1. Have you been to the ER, urgent care clinic since your last visit? Hospitalized since your last visit? No    2. Have you seen or consulted any other health care providers outside of the [address] [place] [address] since your last visit? Include any pap smears or colon screening. No    3) Do you have an Advance Directive on file? no    4) Are you interested in receiving information on Advance Directives? NO      Patient is accompanied by self I have received verbal consent from Giovanna Guerrero to discuss any/all medical information while they are present in the room.

## 2021-06-18 ENCOUNTER — CLINICAL SUPPORT (OUTPATIENT)
Dept: INTERNAL MEDICINE CLINIC | Age: 26
End: 2021-06-18
Payer: MEDICAID

## 2021-06-18 DIAGNOSIS — Z23 ENCOUNTER FOR IMMUNIZATION: Primary | ICD-10-CM

## 2021-06-18 PROCEDURE — 90471 IMMUNIZATION ADMIN: CPT | Performed by: INTERNAL MEDICINE

## 2021-06-18 PROCEDURE — 90715 TDAP VACCINE 7 YRS/> IM: CPT | Performed by: INTERNAL MEDICINE

## 2021-07-30 ENCOUNTER — VIRTUAL VISIT (OUTPATIENT)
Dept: INTERNAL MEDICINE CLINIC | Age: 26
End: 2021-07-30
Payer: MEDICAID

## 2021-07-30 DIAGNOSIS — Z20.822 EXPOSURE TO COVID-19 VIRUS: ICD-10-CM

## 2021-07-30 DIAGNOSIS — J45.909 ASTHMA, UNSPECIFIED ASTHMA SEVERITY, UNSPECIFIED WHETHER COMPLICATED, UNSPECIFIED WHETHER PERSISTENT: Primary | ICD-10-CM

## 2021-07-30 PROCEDURE — 99441 PR PHYS/QHP TELEPHONE EVALUATION 5-10 MIN: CPT | Performed by: NURSE PRACTITIONER

## 2021-07-30 NOTE — PROGRESS NOTES
Amna Rivas is a 27 y.o. female, evaluated via audio-only technology on 7/30/2021 for Concern For COVID-19 (Coronavirus) and Letter for School/Work  . Assessment & Plan:   Diagnoses and all orders for this visit:    1. Asthma, unspecified asthma severity, unspecified whether complicated, unspecified whether persistent  Will complete work note, per request.    Patient is at increased risk for Covid-19 due to history of asthma. 2. Exposure to COVID-19 virus  Patient may receive Covid-19 screening tests. Discussed locations for testing. Advised incubation period for Covid-19 can be up to 14 days after exposure. Advised to monitor for development of symptoms. Recommend continued Covid-19 precautions. Patient encouraged to call or return to office if symptoms do not improve or worsen. Reviewed medications and side effects in detail. Reviewed plan of care with patient who acknowledges understanding and agrees. 12  Subjective: This is a patient of Dr. Harris Stanton who presents today with request for work note. Patient's history includes asthma. She requests a note for her employer stating she is high risk for Covid-19 due to asthma history. She is currently working from home. Patient also notes that her sister with whom she lives has had close contact with someone who has now tested positive for Covid-19. Patient and sister remain asymptomatic. Patient wonders if she should be tested. She has not received Covid-19 vaccination. Prior to Admission medications    Medication Sig Start Date End Date Taking? Authorizing Provider   albuterol (PROVENTIL HFA, VENTOLIN HFA, PROAIR HFA) 90 mcg/actuation inhaler Take 1 Puff by inhalation every six (6) hours as needed for Wheezing. 5/19/21  Yes Thanh Mclean MD   naproxen (NAPROSYN) 500 mg tablet Take 1 Tablet by mouth two (2) times daily as needed for Pain.  5/19/21  Yes Thanh Mclean MD   cholecalciferol, vitamin D3, 12.5 mcg/5 mL (500 unit/5 mL) liqd 2 tsp BID 4/21/21  Yes Manuel Echevarria NP   Nebulizer & Compressor machine Use as directed 1/22/21  Yes Manuel Echevarria NP   Nebulizer Accessories kit Use as directed 1/22/21  Yes Manuel Echevarria NP   albuterol (PROVENTIL VENTOLIN) 2.5 mg /3 mL (0.083 %) nebu 3 mL by Nebulization route every six (6) hours as needed for Wheezing. 1/22/21  Yes Manuel Echevarria NP   dextroamphetamine-amphetamine (ADDERALL) 10 mg tablet Take 1 Tab by mouth two (2) times a day. Max Daily Amount: 20 mg. 11/19/20  Yes Fabiana Benoit MD   rizatriptan (MAXALT-MLT) 5 mg rapid dissolve tablet TAKE 1 TABLET BY MOUTH AS ONE DOSE 10/23/20  Yes Provider, Historical   famotidine (PEPCID) 20 mg tablet TAKE 1 TABLET BY MOUTH TWICE A DAY 10/5/20  Yes Provider, Historical     No Known Allergies  Past Medical History:   Diagnosis Date    ADHD     Asthma     Depression     Headache     Psychotic disorder ([facility] [address]      History reviewed. No pertinent surgical history. ROS    Patient-Reported Vitals 7/30/2021   Patient-Reported Weight 116 lbs   Patient-Reported Height -   Patient-Reported LMP 7/19/21        Nick Nazario, who was evaluated through a patient-initiated, synchronous (real-time) audio only encounter, and/or her healthcare decision maker, is aware that it is a billable service, with coverage as determined by her insurance carrier. She provided verbal consent to proceed: Yes. She has not had a related appointment within my department in the past 7 days or scheduled within the next 24 hours.       Total Time: minutes: 5-10 minutes    Cherise Griffin NP

## 2021-07-30 NOTE — LETTER
7/30/2021 2:56 PM    Ms. Brenda Maguire  [address]  [place] [address]      To Whom It May Concern:    Brenda Maguire is currently under the care of [address]. Due to her past medical history, she is at increased risk for severe Covid-19. If there are questions or concerns please have the patient contact our office.         Sincerely,      Gaye Murphy NP

## 2021-07-30 NOTE — PROGRESS NOTES
Health Maintenance Due   Topic Date Due    Hepatitis C Screening  Never done    HPV Age 9Y-34Y (1 - 2-dose series) Never done    COVID-19 Vaccine (1) Never done    PAP AKA CERVICAL CYTOLOGY  Never done       Chief Complaint   Patient presents with    Concern For COVID-19 (Coronavirus)    Letter for School/Work       1. Have you been to the ER, urgent care clinic since your last visit? Hospitalized since your last visit? No    2. Have you seen or consulted any other health care providers outside of the [address] [place] [address] since your last visit? Include any pap smears or colon screening. No    3) Do you have an Advance Directive on file? no    4) Are you interested in receiving information on Advance Directives? NO      Patient is accompanied by self I have received verbal consent from Grey Garcia to discuss any/all medical information while they are present in the room.

## 2021-08-19 DIAGNOSIS — N94.6 MENSTRUAL CRAMP: ICD-10-CM

## 2021-08-19 RX ORDER — NAPROXEN 500 MG/1
TABLET ORAL
Qty: 30 TABLET | Refills: 0 | Status: SHIPPED | OUTPATIENT
Start: 2021-08-19 | End: 2021-10-27

## 2021-09-17 ENCOUNTER — HOSPITAL ENCOUNTER (EMERGENCY)
Age: 26
Discharge: HOME | End: 2021-09-17
Attending: STUDENT IN AN ORGANIZED HEALTH CARE EDUCATION/TRAINING PROGRAM
Payer: MEDICAID

## 2021-09-17 ENCOUNTER — APPOINTMENT (OUTPATIENT)
Dept: CT IMAGING | Age: 26
End: 2021-09-17
Attending: STUDENT IN AN ORGANIZED HEALTH CARE EDUCATION/TRAINING PROGRAM
Payer: MEDICAID

## 2021-09-17 VITALS
SYSTOLIC BLOOD PRESSURE: 125 MMHG | DIASTOLIC BLOOD PRESSURE: 81 MMHG | RESPIRATION RATE: 16 BRPM | TEMPERATURE: 98 F | HEART RATE: 81 BPM | OXYGEN SATURATION: 100 %

## 2021-09-17 DIAGNOSIS — N83.202 CYST OF LEFT OVARY: Primary | ICD-10-CM

## 2021-09-17 LAB
ALBUMIN SERPL-MCNC: 3.4 G/DL (ref 3.5–5)
ALBUMIN/GLOB SERPL: 0.9 {RATIO} (ref 1.1–2.2)
ALP SERPL-CCNC: 44 U/L (ref 45–117)
ALT SERPL-CCNC: 18 U/L (ref 12–78)
ANION GAP SERPL CALC-SCNC: 3 MMOL/L (ref 5–15)
APPEARANCE UR: CLEAR
AST SERPL-CCNC: 10 U/L (ref 15–37)
BACTERIA URNS QL MICRO: ABNORMAL /HPF
BASOPHILS # BLD: 0 K/UL (ref 0–0.1)
BASOPHILS NFR BLD: 1 % (ref 0–1)
BILIRUB SERPL-MCNC: 0.2 MG/DL (ref 0.2–1)
BILIRUB UR QL: NEGATIVE
BUN SERPL-MCNC: 12 MG/DL (ref 6–20)
BUN/CREAT SERPL: 14 (ref 12–20)
CALCIUM SERPL-MCNC: 8.7 MG/DL (ref 8.5–10.1)
CHLORIDE SERPL-SCNC: 110 MMOL/L (ref 97–108)
CO2 SERPL-SCNC: 25 MMOL/L (ref 21–32)
COLOR UR: ABNORMAL
COMMENT, HOLDF: NORMAL
CREAT SERPL-MCNC: 0.86 MG/DL (ref 0.55–1.02)
DIFFERENTIAL METHOD BLD: NORMAL
EOSINOPHIL # BLD: 0.2 K/UL (ref 0–0.4)
EOSINOPHIL NFR BLD: 3 % (ref 0–7)
EPITH CASTS URNS QL MICRO: ABNORMAL /LPF
ERYTHROCYTE [DISTWIDTH] IN BLOOD BY AUTOMATED COUNT: 12.3 % (ref 11.5–14.5)
GLOBULIN SER CALC-MCNC: 4 G/DL (ref 2–4)
GLUCOSE SERPL-MCNC: 91 MG/DL (ref 65–100)
GLUCOSE UR STRIP.AUTO-MCNC: NEGATIVE MG/DL
HCG UR QL: NEGATIVE
HCT VFR BLD AUTO: 35.5 % (ref 35–47)
HGB BLD-MCNC: 11.9 G/DL (ref 11.5–16)
HGB UR QL STRIP: ABNORMAL
HYALINE CASTS URNS QL MICRO: ABNORMAL /LPF (ref 0–5)
IMM GRANULOCYTES # BLD AUTO: 0 K/UL (ref 0–0.04)
IMM GRANULOCYTES NFR BLD AUTO: 0 % (ref 0–0.5)
KETONES UR QL STRIP.AUTO: NEGATIVE MG/DL
LEUKOCYTE ESTERASE UR QL STRIP.AUTO: ABNORMAL
LIPASE SERPL-CCNC: 100 U/L (ref 73–393)
LYMPHOCYTES # BLD: 2.8 K/UL (ref 0.8–3.5)
LYMPHOCYTES NFR BLD: 43 % (ref 12–49)
MCH RBC QN AUTO: 30.1 PG (ref 26–34)
MCHC RBC AUTO-ENTMCNC: 33.5 G/DL (ref 30–36.5)
MCV RBC AUTO: 89.9 FL (ref 80–99)
MONOCYTES # BLD: 0.6 K/UL (ref 0–1)
MONOCYTES NFR BLD: 9 % (ref 5–13)
NEUTS SEG # BLD: 2.8 K/UL (ref 1.8–8)
NEUTS SEG NFR BLD: 44 % (ref 32–75)
NITRITE UR QL STRIP.AUTO: NEGATIVE
NRBC # BLD: 0 K/UL (ref 0–0.01)
NRBC BLD-RTO: 0 PER 100 WBC
PH UR STRIP: 6 [PH] (ref 5–8)
PLATELET # BLD AUTO: 270 K/UL (ref 150–400)
PMV BLD AUTO: 11.5 FL (ref 8.9–12.9)
POTASSIUM SERPL-SCNC: 3.6 MMOL/L (ref 3.5–5.1)
PROT SERPL-MCNC: 7.4 G/DL (ref 6.4–8.2)
PROT UR STRIP-MCNC: NEGATIVE MG/DL
RBC # BLD AUTO: 3.95 M/UL (ref 3.8–5.2)
RBC #/AREA URNS HPF: ABNORMAL /HPF (ref 0–5)
SAMPLES BEING HELD,HOLD: NORMAL
SODIUM SERPL-SCNC: 138 MMOL/L (ref 136–145)
SP GR UR REFRACTOMETRY: 1.02 (ref 1–1.03)
UR CULT HOLD, URHOLD: NORMAL
UROBILINOGEN UR QL STRIP.AUTO: 0.2 EU/DL (ref 0.2–1)
WBC # BLD AUTO: 6.3 K/UL (ref 3.6–11)
WBC URNS QL MICRO: ABNORMAL /HPF (ref 0–4)

## 2021-09-17 PROCEDURE — 85025 COMPLETE CBC W/AUTO DIFF WBC: CPT

## 2021-09-17 PROCEDURE — 74011000636 HC RX REV CODE- 636: Performed by: RADIOLOGY

## 2021-09-17 PROCEDURE — 99283 EMERGENCY DEPT VISIT LOW MDM: CPT

## 2021-09-17 PROCEDURE — 36415 COLL VENOUS BLD VENIPUNCTURE: CPT

## 2021-09-17 PROCEDURE — 80053 COMPREHEN METABOLIC PANEL: CPT

## 2021-09-17 PROCEDURE — 81025 URINE PREGNANCY TEST: CPT

## 2021-09-17 PROCEDURE — 81001 URINALYSIS AUTO W/SCOPE: CPT

## 2021-09-17 PROCEDURE — 83690 ASSAY OF LIPASE: CPT

## 2021-09-17 PROCEDURE — 74177 CT ABD & PELVIS W/CONTRAST: CPT

## 2021-09-17 RX ORDER — KETOROLAC TROMETHAMINE 30 MG/ML
15 INJECTION, SOLUTION INTRAMUSCULAR; INTRAVENOUS
Status: DISCONTINUED | OUTPATIENT
Start: 2021-09-17 | End: 2021-09-17

## 2021-09-17 RX ADMIN — IOPAMIDOL 100 ML: 755 INJECTION, SOLUTION INTRAVENOUS at 11:33

## 2021-09-17 NOTE — ED PROVIDER NOTES
Chief Complaint:  Abdominal Pain       HPI:  Nick Nazario is a 27 y.o.  female who presents for evaluation of right sided abdominal pain. Started yesterday morning and went away with tylenol. Pain came back this morning. Hx of gas issues, but more in the chest. Last menses the 27th of August. Patient states she had some diarrhea a couple days ago, this is resolved generally and she feels it was due to something she ate. Patient denies any abdominal surgeries. Has never had pain like this in the past.        Abdominal Pain   This is a new problem. The current episode started yesterday. The problem occurs daily. The problem has not changed since onset. The pain is associated with an unknown factor. The pain is located in the RUQ and RLQ. The pain is at a severity of 5/10. The pain is moderate. Associated symptoms include diarrhea. Pertinent negatives include no fever, no nausea, no vomiting, no constipation, no dysuria, no frequency, no chest pain and no back pain. Nothing worsens the pain. The pain is relieved by acetaminophen. Past workup includes no CT scan, no ultrasound, no surgery. Past Medical History:   Diagnosis Date    ADHD     Asthma     Depression     Headache     Psychotic disorder (HCC)        No past surgical history on file.       Family History:   Problem Relation Age of Onset    No Known Problems Mother     Cancer Father         brain ca    Lung Disease Father         COPD       Social History     Socioeconomic History    Marital status: SINGLE     Spouse name: Not on file    Number of children: Not on file    Years of education: Not on file    Highest education level: Not on file   Occupational History    Not on file   Tobacco Use    Smoking status: Never Smoker    Smokeless tobacco: Never Used   Substance and Sexual Activity    Alcohol use: Never    Drug use: Never    Sexual activity: Never     Comment: single,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   Other Topics Concern    Not on file Social History Narrative    ** Merged History Encounter **          Social Determinants of Health     Financial Resource Strain:     Difficulty of Paying Living Expenses:    Food Insecurity:     Worried About Running Out of Food in the Last Year:     [address] in the Last Year:    Transportation Needs:     Lack of Transportation (Medical):  Lack of Transportation (Non-Medical):    Physical Activity:     Days of Exercise per Week:     Minutes of Exercise per Session:    Stress:     Feeling of Stress :    Social Connections:     Frequency of Communication with Friends and Family:     Frequency of Social Gatherings with Friends and Family:     Attends Christianity Services:     Active Member of Clubs or Organizations:     Attends Club or Organization Meetings:     Marital Status:    Intimate Partner Violence:     Fear of Current or Ex-Partner:     Emotionally Abused:     Physically Abused:     Sexually Abused: ALLERGIES: Patient has no known allergies. Review of Systems   Constitutional: Negative. Negative for fever. HENT: Negative. Eyes: Negative. Respiratory: Negative. Cardiovascular: Negative. Negative for chest pain. Gastrointestinal: Positive for abdominal pain and diarrhea. Negative for constipation, nausea and vomiting. Endocrine: Negative. Genitourinary: Negative. Negative for dysuria and frequency. Musculoskeletal: Negative. Negative for back pain. Skin: Negative. Allergic/Immunologic: Negative. Neurological: Negative. Hematological: Negative. Psychiatric/Behavioral: Negative. Vitals:    09/17/21 0928   BP: 125/81   Pulse: 81   Resp: 16   Temp: 98 °F (36.7 °C)   SpO2: 100%            Physical Exam  Vitals and nursing note reviewed. Constitutional:       General: She is not in acute distress. Appearance: Normal appearance. She is ill-appearing. HENT:      Head: Normocephalic and atraumatic.       Right Ear: External ear normal.      Left Ear: External ear normal.      Nose: Nose normal.      Mouth/Throat:      Mouth: Mucous membranes are moist.      Pharynx: Oropharynx is clear. No posterior oropharyngeal erythema. Eyes:      Extraocular Movements: Extraocular movements intact. Conjunctiva/sclera: Conjunctivae normal.   Cardiovascular:      Rate and Rhythm: Normal rate. Pulses: Normal pulses. Heart sounds: Normal heart sounds. Pulmonary:      Effort: Pulmonary effort is normal.      Breath sounds: Normal breath sounds. Chest:      Chest wall: No deformity or tenderness. Abdominal:      General: Abdomen is flat. There is no distension. Tenderness: There is abdominal tenderness in the right upper quadrant and right lower quadrant. There is no right CVA tenderness or left CVA tenderness. Negative signs include Rovsing's sign. Musculoskeletal:         General: No deformity or signs of injury. Normal range of motion. Cervical back: Normal range of motion and neck supple. No tenderness. Skin:     General: Skin is warm and dry. Capillary Refill: Capillary refill takes less than 2 seconds. Neurological:      General: No focal deficit present. Mental Status: She is alert and oriented to person, place, and time. Psychiatric:         Mood and Affect: Mood normal.         Behavior: Behavior normal.          MDM       LABORATORY RESULTS:  Labs Reviewed   URINALYSIS W/MICROSCOPIC - Abnormal; Notable for the following components:       Result Value    Blood SMALL (*)     Leukocyte Esterase TRACE (*)     Epithelial cells MANY (*)     Bacteria 1+ (*)     All other components within normal limits   METABOLIC PANEL, COMPREHENSIVE - Abnormal; Notable for the following components:    Chloride 110 (*)     Anion gap 3 (*)     AST (SGOT) 10 (*)     Alk.  phosphatase 44 (*)     Albumin 3.4 (*)     A-G Ratio 0.9 (*)     All other components within normal limits   URINE CULTURE HOLD SAMPLE   CBC WITH AUTOMATED DIFF   LIPASE   SAMPLES BEING HELD   HCG URINE, QL. - POC       IMAGING RESULTS:  CT ABD PELV W CONT   Final Result   Complex cyst versus 2 small simple cyst left ovary. Otherwise no acute   abnormality is identified. MEDICATIONS GIVEN:  Medications   iopamidoL (ISOVUE-370) 76 % injection 100 mL (100 mL IntraVENous Given 9/17/21 1133)       Differential diagnosis: Appendicitis, other GI pathology, dehydration, abdominal cramping, pregnancy, pancreatitis, gallbladder disease    ED physician interpretation of laboratory results: Patient's lab work without acute findings requiring critical intervention. Patient's UA does have mild signs of infection the patient denies any dysuria. No indication to treat asymptomatic UA. MDM: Patient is a healthy 29-year-old female for any ED with intermittent right abdominal pain with unremarkable CT scan, lab work and resolution of pain appropriate for outpatient follow-up. Patient's vital signs are stable, patient afebrile. Patient physical exam unremarkable. Patient CT shows left-sided cysts but no concerning pathology in the right side of her body where she is having pain. Doubt ovarian torsion at this time. No other significant trephines for appendicitis pancreatitis or gallbladder disease. Patient symptoms have resolved. Patient instructed to take Tylenol Motrin and follow-up with PCP. Pain returns patient instructed return to the ED. Key discharge instructions and summary of care: CT with 4.1 cm complex cyst left ovary versus 2 adjacent cysts. These are usually physiologic/normal and are usually due to your monthly menstrual cycle. Your lab work showed a stable hemoglobin with no signs of dehydration. You do have a left-sided ovarian cyst that is likely not contributing to your pain on the right side. However cyst of this size increases her risk of ovarian torsion or twisting.   As her pain is resolved now and was not on the side with the ovarian cyst torsion is unlikely at this time. If you have pain return please return to the ED for ultrasound for further evaluation. Additionally follow-up with your gynecologist for this cyst.    You may take Tylenol and Motrin/Aleve for pain. The patient has been re-evaluated and feeling better. Patient is stable for discharge. All available radiology and laboratory results have been reviewed with patient and/or available family. Patient and/or family verbally conveyed their understanding and agreement of the patient's signs, symptoms, diagnosis, treatment and prognosis and additionally agree to follow-up as recommended in the discharge instructions or to return to the Emergency Department should their condition change or worsen prior to their follow-up appointment. All questions have been answered and patient and/or available family express understanding. IMPRESSION:  1. Cyst of left ovary        DISPOSITION: Discharged    Osmel Santana MD        Procedures

## 2021-09-17 NOTE — DISCHARGE INSTRUCTIONS
CT with 4.1 cm complex cyst left ovary versus 2 adjacent cysts. These are usually physiologic/normal and are usually due to your monthly menstrual cycle. Your lab work showed a stable hemoglobin with no signs of dehydration. You do have a left-sided ovarian cyst that is likely not contributing to your pain on the right side. However cyst of this size increases her risk of ovarian torsion or twisting. As her pain is resolved now and was not on the side with the ovarian cyst torsion is unlikely at this time. If you have pain return please return to the ED for ultrasound for further evaluation. Additionally follow-up with your gynecologist for this cyst.    You may take Tylenol and Motrin/Aleve for pain.

## 2021-09-17 NOTE — ED TRIAGE NOTES
Pt c/o right upper abd pain since yesterday, +diarrhea, denies fever, denies n/v , denies pregnancy , denies urinary symptoms, hurts worse to breath in

## 2021-09-17 NOTE — Clinical Note
[address]  [address] [facility] [address]  [phone]    Work/School Note    Date: 9/17/2021    To Whom It May concern:      Narda Figueroa was seen and treated today in the emergency room by the following provider(s):  Attending Provider: Zohaib Clemens MD.      Narda Figueroa is excused from work/school on 09/17/21. She is clear to return to work/school on 09/18/21. Sincerely,          Narcisa Ray MD

## 2021-10-26 ENCOUNTER — OFFICE VISIT (OUTPATIENT)
Dept: OBGYN CLINIC | Age: 26
End: 2021-10-26
Payer: MEDICAID

## 2021-10-26 VITALS
HEIGHT: 59 IN | BODY MASS INDEX: 23.59 KG/M2 | WEIGHT: 117 LBS | DIASTOLIC BLOOD PRESSURE: 86 MMHG | SYSTOLIC BLOOD PRESSURE: 122 MMHG

## 2021-10-26 DIAGNOSIS — N83.202 CYST OF LEFT OVARY: Primary | ICD-10-CM

## 2021-10-26 DIAGNOSIS — R10.2 PELVIC PAIN IN FEMALE: ICD-10-CM

## 2021-10-26 PROCEDURE — 99213 OFFICE O/P EST LOW 20 MIN: CPT | Performed by: OBSTETRICS & GYNECOLOGY

## 2021-10-26 NOTE — PROGRESS NOTES
Problem Visit    Jose E Adame is a 27 y.o. G0 presenting for ED follow up of 4 cm complex left ovarian cyst. CT scan showed complex cyst versus 2 small simple cyst left ovary. Otherwise no acute  abnormality is identified. Initially presented for right sided pelvic pain. Pain has since resolved. Denies pain today. Pain occurred about 10 days prior to menses. Also with chronic dysmenorrhea, good response with tylenol ibuprofen, concerned d/t family h/o endometriosis. She is not sexually active. PMH of ADHD, no other medications. No CI to estrogen identified. Student at [employer] and is also working. Ob/Gyn Hx:  G0  LMP-10/26/21  Menses- regular  Contraception-denies  STI- denies  ? SA-never    Health maintenance:  Pap-denies  Viky Reese    Past Medical History:   Diagnosis Date    ADHD     Asthma     Depression     Headache     Psychotic disorder (HCC)        No past surgical history on file.     Family History   Problem Relation Age of Onset    No Known Problems Mother     Cancer Father         brain ca    Lung Disease Father         COPD       Social History     Socioeconomic History    Marital status: SINGLE     Spouse name: Not on file    Number of children: Not on file    Years of education: Not on file    Highest education level: Not on file   Occupational History    Not on file   Tobacco Use    Smoking status: Never Smoker    Smokeless tobacco: Never Used   Substance and Sexual Activity    Alcohol use: Never    Drug use: Never    Sexual activity: Never     Comment: single,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   Other Topics Concern    Not on file   Social History Narrative    ** Merged History Encounter **          Social Determinants of Health     Financial Resource Strain:     Difficulty of Paying Living Expenses:    Food Insecurity:     Worried About Running Out of Food in the Last Year:     Ran Out of Food in the Last Year:    Transportation Needs:     Lack of Transportation (Medical):    [facility] Lack of Transportation (Non-Medical):    Physical Activity:     Days of Exercise per Week:     Minutes of Exercise per Session:    Stress:     Feeling of Stress :    Social Connections:     Frequency of Communication with Friends and Family:     Frequency of Social Gatherings with Friends and Family:     Attends Hindu Services:     Active Member of Clubs or Organizations:     Attends Club or Organization Meetings:     Marital Status:    Intimate Partner Violence:     Fear of Current or Ex-Partner:     Emotionally Abused:     Physically Abused:     Sexually Abused:        Current Outpatient Medications   Medication Sig Dispense Refill    naproxen (NAPROSYN) 500 mg tablet Take 1 tablet by mouth twice daily as needed for pain 30 Tablet 0    albuterol (PROVENTIL HFA, VENTOLIN HFA, PROAIR HFA) 90 mcg/actuation inhaler Take 1 Puff by inhalation every six (6) hours as needed for Wheezing. 1 Inhaler 5    cholecalciferol, vitamin D3, 12.5 mcg/5 mL (500 unit/5 mL) liqd 2 tsp  mL 1    Nebulizer & Compressor machine Use as directed 1 Each 0    Nebulizer Accessories kit Use as directed 1 Kit 0    albuterol (PROVENTIL VENTOLIN) 2.5 mg /3 mL (0.083 %) nebu 3 mL by Nebulization route every six (6) hours as needed for Wheezing. 30 Nebule 1    dextroamphetamine-amphetamine (ADDERALL) 10 mg tablet Take 1 Tab by mouth two (2) times a day.  Max Daily Amount: 20 mg. 60 Tab 0    rizatriptan (MAXALT-MLT) 5 mg rapid dissolve tablet TAKE 1 TABLET BY MOUTH AS ONE DOSE      famotidine (PEPCID) 20 mg tablet TAKE 1 TABLET BY MOUTH TWICE A DAY         No Known Allergies    Review of Systems - History obtained from the patient  Constitutional: negative for weight loss, fever, night sweats  HEENT: negative for hearing loss, earache, congestion, snoring, sorethroat  CV: negative for chest pain, palpitations, edema  Resp: negative for cough, shortness of breath, wheezing  GI: negative for change in bowel habits, abdominal pain, black or bloody stools  : negative for frequency, dysuria, hematuria, vaginal discharge, +RLQ pain resolved  MSK: negative for back pain, joint pain, muscle pain  Breast: negative for breast lumps, nipple discharge, galactorrhea  Skin :negative for itching, rash, hives  Neuro: negative for dizziness, headache, confusion, weakness  Psych: negative for anxiety, depression, change in mood  Heme/lymph: negative for bleeding, bruising, pallor    Physical Exam  Visit Vitals  /86   Ht 4' 11\" (1.499 m)   Wt 117 lb (53.1 kg)   LMP 10/26/2021   BMI 23.63 kg/m²       PHYSICAL EXAMINATION    Constitutional  · Appearance: well-nourished, well developed, alert, in no acute distress    HENT  · Head and Face: appears normal    Chest  · Respiratory Effort: breathing non-labored  · Auscultation: normal breath sounds    Cardiovascular  · Heart:  · Auscultation: regular rate and rhythm without murmur    Gastrointestinal  · Abdominal Examination: abdomen non-tender to palpation, normal bowel sounds, no masses present  · Liver and spleen: no hepatomegaly present, spleen not palpable  · Hernias: no hernias identified    Skin  · General Inspection: no rash, no lesions identified    Neurologic/Psychiatric  · Mental Status:  · Orientation: grossly oriented to person, place and time  · Mood and Affect: mood normal, affect appropriate    Assessment/Plan:  27 y.o. G0 with RLQ pain that has now resolved. Finding of 4cm complex cyst LOV vs. 2 smaller simple cysts on CT scan done at time of RLQ pain workup. Also with chronic dysmenorrhea.     -repeat TVUS in 2-4 wks  -pain/torsion precautions  -nsaids prn dysmenorrhea  -also discussed hormonal contraceptive options for cycle control in the future    RTC 2 wks for Magdiel Quick MD  10/26/2021  11:35 AM

## 2021-10-26 NOTE — PATIENT INSTRUCTIONS
Functional Ovarian Cyst: Care Instructions  Overview     A functional ovarian cyst is a fluid-filled sac that forms on the ovary. A sac normally forms during ovulation to hold a maturing egg. Usually the sac goes away after the egg is released. But if the egg is not released, or if the sac closes up after the egg is released, the sac can swell up with fluid. This forms a functional cyst.  These ovarian cysts are different than ovarian growths caused by other problems, such as cancer. Most functional ovarian cysts cause no symptoms and go away on their own. Some cause mild pain. Others can cause severe pain when they break or bleed. Follow-up care is a key part of your treatment and safety. Be sure to make and go to all appointments, and call your doctor if you are having problems. It's also a good idea to know your test results and keep a list of the medicines you take. How can you care for yourself at home? · Use heat, such as a hot water bottle, a heating pad set on low, or a warm bath, to relax tense muscles and relieve cramping. · Be safe with medicines. Take pain medicines exactly as directed. ? If the doctor gave you a prescription medicine for pain, take it as prescribed. ? If you are not taking a prescription pain medicine, ask your doctor if you can take an over-the-counter medicine. · Avoid constipation. Make sure you drink enough fluids and include fruits, vegetables, and fiber in your diet each day. Constipation does not cause ovarian cysts, but it may make you feel more uncomfortable. When should you call for help? Call your doctor now or seek immediate medical care if:    · You have severe vaginal bleeding.     · You have new or worse belly or pelvic pain. Watch closely for changes in your health, and be sure to contact your doctor if:    · You have unusual vaginal bleeding.     · You do not get better as expected. Where can you learn more?   Go to http://www.gray.com/  Enter E850 in the search box to learn more about \"Functional Ovarian Cyst: Care Instructions. \"  Current as of: February 11, 2021               Content Version: 13.0  © 8749-5306 [employer]. Care instructions adapted under license by [employer] (which disclaims liability or warranty for this information). If you have questions about a medical condition or this instruction, always ask your healthcare professional. [place] [address] any warranty or liability for your use of this information.

## 2021-10-27 DIAGNOSIS — J45.909 ASTHMA, UNSPECIFIED ASTHMA SEVERITY, UNSPECIFIED WHETHER COMPLICATED, UNSPECIFIED WHETHER PERSISTENT: Primary | ICD-10-CM

## 2021-10-27 DIAGNOSIS — E55.9 VITAMIN D DEFICIENCY: ICD-10-CM

## 2021-10-27 DIAGNOSIS — N94.6 MENSTRUAL CRAMP: ICD-10-CM

## 2021-10-27 RX ORDER — NAPROXEN 500 MG/1
TABLET ORAL
Qty: 30 TABLET | Refills: 0 | Status: SHIPPED | OUTPATIENT
Start: 2021-10-27 | End: 2022-02-25

## 2021-10-27 RX ORDER — NAPROXEN 500 MG/1
TABLET ORAL
Qty: 30 TABLET | Refills: 0 | OUTPATIENT
Start: 2021-10-27

## 2021-10-27 RX ORDER — CHOLECALCIFEROL (VITAMIN D3) 12.5 MCG/5
LIQUID (ML) ORAL
Qty: 480 ML | Refills: 1 | Status: SHIPPED | OUTPATIENT
Start: 2021-10-27

## 2021-10-27 RX ORDER — ALBUTEROL SULFATE 0.83 MG/ML
2.5 SOLUTION RESPIRATORY (INHALATION)
Qty: 30 NEBULE | Refills: 1 | Status: SHIPPED | OUTPATIENT
Start: 2021-10-27

## 2021-12-14 ENCOUNTER — VIRTUAL VISIT (OUTPATIENT)
Dept: INTERNAL MEDICINE CLINIC | Age: 26
End: 2021-12-14
Payer: MEDICAID

## 2021-12-14 DIAGNOSIS — J45.909 MODERATE ASTHMA, UNSPECIFIED WHETHER COMPLICATED, UNSPECIFIED WHETHER PERSISTENT: ICD-10-CM

## 2021-12-14 DIAGNOSIS — Z86.59 HISTORY OF ATTENTION DEFICIT HYPERACTIVITY DISORDER (ADHD): ICD-10-CM

## 2021-12-14 DIAGNOSIS — R10.13 DYSPEPSIA: Primary | ICD-10-CM

## 2021-12-14 PROCEDURE — 99214 OFFICE O/P EST MOD 30 MIN: CPT | Performed by: INTERNAL MEDICINE

## 2021-12-14 RX ORDER — DEXTROAMPHETAMINE SACCHARATE, AMPHETAMINE ASPARTATE, DEXTROAMPHETAMINE SULFATE AND AMPHETAMINE SULFATE 2.5; 2.5; 2.5; 2.5 MG/1; MG/1; MG/1; MG/1
10 TABLET ORAL 2 TIMES DAILY
Qty: 60 TABLET | Refills: 0 | Status: SHIPPED | OUTPATIENT
Start: 2022-01-14 | End: 2022-03-18 | Stop reason: SDUPTHER

## 2021-12-14 RX ORDER — DEXTROAMPHETAMINE SACCHARATE, AMPHETAMINE ASPARTATE, DEXTROAMPHETAMINE SULFATE AND AMPHETAMINE SULFATE 2.5; 2.5; 2.5; 2.5 MG/1; MG/1; MG/1; MG/1
10 TABLET ORAL 2 TIMES DAILY
Qty: 60 TABLET | Refills: 0 | Status: SHIPPED | OUTPATIENT
Start: 2021-12-14 | End: 2021-12-14 | Stop reason: SDUPTHER

## 2021-12-14 RX ORDER — DEXTROAMPHETAMINE SACCHARATE, AMPHETAMINE ASPARTATE, DEXTROAMPHETAMINE SULFATE AND AMPHETAMINE SULFATE 2.5; 2.5; 2.5; 2.5 MG/1; MG/1; MG/1; MG/1
10 TABLET ORAL 2 TIMES DAILY
Qty: 60 TABLET | Refills: 0 | Status: SHIPPED | OUTPATIENT
Start: 2021-01-14 | End: 2021-12-14 | Stop reason: SDUPTHER

## 2021-12-14 RX ORDER — FAMOTIDINE 20 MG/1
20 TABLET, FILM COATED ORAL 2 TIMES DAILY
Qty: 30 TABLET | Refills: 1 | Status: SHIPPED | OUTPATIENT
Start: 2021-12-14

## 2021-12-14 NOTE — PROGRESS NOTES
Hillary Torrez is a 27 y.o. female who was seen by synchronous (real-time) audio-video technology on 12/14/2021 for Behavioral Problem (6 month follow up) and Depression (6 month follow up)        Assessment & Plan:   Diagnoses and all orders for this visit:    1. Dyspepsia    She still having a lot of gas and heartburn. Advised her to avoid spicy food. Will refill,  -     famotidine (PEPCID) 20 mg tablet; Take 1 Tablet by mouth two (2) times a day. 1 tab po every day prn    2. History of attention deficit hyperactivity disorder (ADHD)    I have reviewed her chart from hours psychiatrist Dr. Jose Carrillo in 2019 that she was diagnosed with ADHD. She did not take the medication for over a year. We will start refilling her area Adderall medicine. Will give,  -     dextroamphetamine-amphetamine (ADDERALL) 10 mg tablet; Take 1 Tablet by mouth two (2) times a day. Max Daily Amount: 20 mg.  #60, no refill. Will give 2 prescriptions sent electronically to her pharmacy. Follow-up in 2 months. Reviewed her . 3.  Mild asthma  Using albuterol inhaler as needed. Doing well. I spent at least 30 minutes on this visit with this established patient. Subjective:   Ms. Moraima Morrison is here for follow-up. She is doing well, she is diagnosed with ADHD, she works full-time, not able to function without ADHD medication. She was seen by psychiatrist Dr. Rachel Lynn couple of years back and was receiving Adderall since then. I had given her 1 Adderall prescription last year. Has asthma, not wheezing much. Taking albuterol inhaler as needed. Using nebulizer sometimes. No cough and cold. Reported heartburn and acidity and lot of gas. She used Pepcid in the past, would like to get a refill. She ended up in emergency room with abdominal pain. Had lab work done few months back, all are stable. Did not take Covid vaccine yet. She is willing to take.     Prior to Admission medications    Medication Sig Start Date End Date Taking? Authorizing Provider   dextroamphetamine-amphetamine (ADDERALL) 10 mg tablet Take 1 Tablet by mouth two (2) times a day. Max Daily Amount: 20 mg. 1/14/22  Yes Royal Ismael MD   famotidine (PEPCID) 20 mg tablet Take 1 Tablet by mouth two (2) times a day. 1 tab po every day prn 12/14/21  Yes Royal Ismael MD   albuterol (PROVENTIL HFA, VENTOLIN HFA, PROAIR HFA) 90 mcg/actuation inhaler Take 1 Puff by inhalation every six (6) hours as needed for Wheezing. 10/27/21  Yes Royal Ismael MD   albuterol (PROVENTIL VENTOLIN) 2.5 mg /3 mL (0.083 %) nebu 3 mL by Nebulization route every six (6) hours as needed for Wheezing. 10/27/21  Yes Royal Ismael MD   naproxen (NAPROSYN) 500 mg tablet Take 1 tablet by mouth twice daily as needed for pain 10/27/21  Yes Robert Muhammad NP   Nebulizer & Compressor machine Use as directed 1/22/21  Yes Beth Rodriguez NP   Nebulizer Accessories kit Use as directed 1/22/21  Yes Beth Rodriguez NP   dextroamphetamine-amphetamine (ADDERALL) 10 mg tablet Take 1 Tablet by mouth two (2) times a day. Max Daily Amount: 20 mg. 12/14/21 12/14/21  Royal Ismael MD   dextroamphetamine-amphetamine (ADDERALL) 10 mg tablet Take 1 Tablet by mouth two (2) times a day. Max Daily Amount: 20 mg. 1/14/21 12/14/21  Royal Ismael MD   cholecalciferol, vitamin D3, 12.5 mcg/5 mL (500 unit/5 mL) liqd 2 tsp BID  Patient not taking: Reported on 12/14/2021 10/27/21   Royal Ismael MD   dextroamphetamine-amphetamine (ADDERALL) 10 mg tablet Take 1 Tab by mouth two (2) times a day.  Max Daily Amount: 20 mg. 11/19/20 12/14/21  Royal Ismael MD   rizatriptan (MAXALT-MLT) 5 mg rapid dissolve tablet TAKE 1 TABLET BY MOUTH AS ONE DOSE  Patient not taking: Reported on 12/14/2021 10/23/20 12/14/21  Provider, Historical   famotidine (PEPCID) 20 mg tablet TAKE 1 TABLET BY MOUTH TWICE A DAY  Patient not taking: Reported on 12/14/2021 10/5/20 12/14/21  Provider, Historical     Past Medical History:   Diagnosis Date    ADHD  Asthma     Depression     Headache     Ovarian cyst     Psychotic disorder (HCC)        ROS significant for ADHD and heartburn. Objective:     Patient-Reported Vitals 12/14/2021   Patient-Reported Weight 112lb   Patient-Reported Height -   Patient-Reported LMP 11/21/2021          Constitutional: [x] Appears well-developed and well-nourished [x] No apparent distress      [] Abnormal -     Mental status: [x] Alert and awake  [x] Oriented to person/place/time [x] Able to follow commands    [] Abnormal -       HENT: [x] Normocephalic, atraumatic  [] Abnormal -   [x] Mouth/Throat: Mucous membranes are moist    External Ears [x] Normal  [] Abnormal -    Neck: [x] No visualized mass [] Abnormal -     Pulmonary/Chest: [x] Respiratory effort normal   [x] No visualized signs of difficulty breathing or respiratory distress        [] Abnormal -      Musculoskeletal:   [x] Normal gait with no signs of ataxia         [x] Normal range of motion of neck        [] Abnormal -     Neurological:        [x] No Facial Asymmetry (Cranial nerve 7 motor function) (limited exam due to video visit)          [x] No gaze palsy        [] Abnormal -          Skin:        [x] No significant exanthematous lesions or discoloration noted on facial skin         [] Abnormal -            Psychiatric:       [x] Normal Affect [] Abnormal -   ADD present. [x] No Hallucinations    Other pertinent observable physical exam findings:-        We discussed the expected course, resolution and complications of the diagnosis(es) in detail. Medication risks, benefits, costs, interactions, and alternatives were discussed as indicated. I advised her to contact the office if her condition worsens, changes or fails to improve as anticipated. She expressed understanding with the diagnosis(es) and plan. [place], was evaluated through a synchronous (real-time) audio-video encounter.  The patient (or guardian if applicable) is aware that this is a billable service. Verbal consent to proceed has been obtained within the past 12 months. The visit was conducted pursuant to the emergency declaration under the [address] [place] [address] and the [employer] General Act. Patient identification was verified, and a caregiver was present when appropriate. The patient was located in a state where the provider was credentialed to provide care.       Natalie Husain MD

## 2021-12-14 NOTE — PROGRESS NOTES
Health Maintenance Due   Topic Date Due    Hepatitis C Screening  Never done    HPV Age 9Y-34Y (1 - 2-dose series) Never done    COVID-19 Vaccine (1) Never done    Pap Smear  Never done    Flu Vaccine (1) 09/01/2021       Chief Complaint   Patient presents with    Behavioral Problem     6 month follow up    Depression     6 month follow up       1. Have you been to the ER, urgent care clinic since your last visit? Hospitalized since your last visit? No    2. Have you seen or consulted any other health care providers outside of the [address] [place] [address] since your last visit? Include any pap smears or colon screening. No    3) Do you have an Advance Directive on file? no    4) Are you interested in receiving information on Advance Directives? NO      Patient is accompanied by self I have received verbal consent from Karen Canchola to discuss any/all medical information while they are present in the room.

## 2021-12-20 ENCOUNTER — DOCUMENTATION ONLY (OUTPATIENT)
Dept: INTERNAL MEDICINE CLINIC | Age: 26
End: 2021-12-20

## 2021-12-20 ENCOUNTER — TELEPHONE (OUTPATIENT)
Dept: INTERNAL MEDICINE CLINIC | Age: 26
End: 2021-12-20

## 2021-12-20 DIAGNOSIS — Z86.59 HISTORY OF ATTENTION DEFICIT HYPERACTIVITY DISORDER (ADHD): Primary | ICD-10-CM

## 2021-12-20 NOTE — TELEPHONE ENCOUNTER
Call placed to pt and spoke with her. I advised her that her PA was denied for her Adderall. She states she has already picked up the medication. I advised ok and if something changes at her next refill, we can do another PA, she will need to have urine drug screen.

## 2021-12-20 NOTE — PROGRESS NOTES
Chief Complaint   Patient presents with    Prior Auth     Amphetamine-Dextroamphetamine 10MG tablets        Deniedon December 17  PA Case: [id number], Status: Denied.

## 2021-12-20 NOTE — TELEPHONE ENCOUNTER
----- Message from Patricia Mohamud sent at 12/20/2021 11:23 AM EST -----  Subject: Message to Provider    QUESTIONS  Information for Provider? Pt is returning a call from [place] and would   like a call back. ---------------------------------------------------------------------------  --------------  Sohail Slot INFO  What is the best way for the office to contact you? OK to leave message on   voicemail  Preferred Call Back Phone Number?  [phone]  ---------------------------------------------------------------------------  --------------  SCRIPT ANSWERS  undefined

## 2022-02-01 NOTE — PROGRESS NOTES
Pt given info for Dr. Dontrell Teran on vm. Summer Falk is a 27 y.o. female  who presents for routine immunization(s). Patient denies any symptoms , reactions or allergies that would exclude them from being immunized today. Risks and adverse reactions were discussed. The patient/caregiver was provided the VIS and allotted time to read and ask questions prior to administration of vaccine. Patient voiced full understanding and signed Adult Immunization Consent form. All questions were addressed. Patient was observed for 10 min post injection. There were no reactions observed.       Results for orders placed or performed in visit on 04/13/21   URINALYSIS W/MICROSCOPIC   Result Value Ref Range    Specific Gravity 1.023 1.005 - 1.030    pH (UA) 6.5 5.0 - 7.5    Color Yellow Yellow    Appearance Clear Clear    Leukocyte Esterase Negative Negative    Protein Negative Negative/Trace    Glucose Negative Negative    Ketone Negative Negative    Blood Negative Negative    Bilirubin Negative Negative    Urobilinogen 0.2 0.2 - 1.0 mg/dL    Nitrites Negative Negative    Microscopic Examination Comment     Microscopic exam See additional order    MICROSCOPIC EXAMINATION   Result Value Ref Range    WBC None seen 0 - 5 /hpf    RBC 3-10 (A) 0 - 2 /hpf    Epithelial cells 0-10 0 - 10 /hpf    Casts None seen None seen /lpf    Bacteria None seen None seen/Few

## 2022-02-25 DIAGNOSIS — N94.6 MENSTRUAL CRAMP: ICD-10-CM

## 2022-02-25 RX ORDER — NAPROXEN 500 MG/1
TABLET ORAL
Qty: 30 TABLET | Refills: 0 | Status: SHIPPED | OUTPATIENT
Start: 2022-02-25 | End: 2022-06-01

## 2022-03-18 ENCOUNTER — VIRTUAL VISIT (OUTPATIENT)
Dept: INTERNAL MEDICINE CLINIC | Age: 27
End: 2022-03-18
Payer: MEDICAID

## 2022-03-18 DIAGNOSIS — R53.83 FATIGUE, UNSPECIFIED TYPE: Primary | ICD-10-CM

## 2022-03-18 DIAGNOSIS — Z11.59 NEED FOR HEPATITIS C SCREENING TEST: ICD-10-CM

## 2022-03-18 DIAGNOSIS — Z86.59 HISTORY OF ATTENTION DEFICIT HYPERACTIVITY DISORDER (ADHD): ICD-10-CM

## 2022-03-18 PROCEDURE — 99214 OFFICE O/P EST MOD 30 MIN: CPT | Performed by: INTERNAL MEDICINE

## 2022-03-18 RX ORDER — DEXTROAMPHETAMINE SACCHARATE, AMPHETAMINE ASPARTATE, DEXTROAMPHETAMINE SULFATE AND AMPHETAMINE SULFATE 2.5; 2.5; 2.5; 2.5 MG/1; MG/1; MG/1; MG/1
10 TABLET ORAL 2 TIMES DAILY
Qty: 60 TABLET | Refills: 0 | Status: SHIPPED | OUTPATIENT
Start: 2022-03-18 | End: 2022-03-18 | Stop reason: SDUPTHER

## 2022-03-18 RX ORDER — DEXTROAMPHETAMINE SACCHARATE, AMPHETAMINE ASPARTATE, DEXTROAMPHETAMINE SULFATE AND AMPHETAMINE SULFATE 2.5; 2.5; 2.5; 2.5 MG/1; MG/1; MG/1; MG/1
10 TABLET ORAL 2 TIMES DAILY
Qty: 60 TABLET | Refills: 0 | Status: SHIPPED | OUTPATIENT
Start: 2022-05-18

## 2022-03-18 RX ORDER — DEXTROAMPHETAMINE SACCHARATE, AMPHETAMINE ASPARTATE, DEXTROAMPHETAMINE SULFATE AND AMPHETAMINE SULFATE 2.5; 2.5; 2.5; 2.5 MG/1; MG/1; MG/1; MG/1
10 TABLET ORAL 2 TIMES DAILY
Qty: 60 TABLET | Refills: 0 | Status: SHIPPED | OUTPATIENT
Start: 2022-04-18 | End: 2022-03-18 | Stop reason: SDUPTHER

## 2022-03-18 NOTE — PROGRESS NOTES
Bronson Hammond is a 27 y.o. female who was seen by synchronous (real-time) audio-video technology on 3/18/2022 for Behavioral Problem, Fatigue, and Asthma        Assessment & Plan:   Diagnoses and all orders for this visit:    1. Fatigue, unspecified type    She is feeling fatigued lately. Will check,  -     CBC WITH AUTOMATED DIFF  -     METABOLIC PANEL, COMPREHENSIVE  -     TSH 3RD GENERATION    2. History of attention deficit hyperactivity disorder (ADHD)    . I have reviewed this patient's report generated by the [place] which does not demonstrate aberrancies and inconsistencies with regard to the historical prescribing of controlled medications to this patient by other providers   not reflecting that that she is taking medication. But patient mentioned that she picked up medication from pharmacy. -     dextroamphetamine-amphetamine (ADDERALL) 10 mg tablet; Take 1 Tablet by mouth two (2) times a day. Max Daily Amount: 20 mg.  3-month prescription electronically sent to pharmacy. Will follow up in 3 months. 3. Need for hepatitis C screening test  -     HEPATITIS C AB        I spent at least 30 minutes on this visit with this established patient. Subjective:   Ms. Katie Garcia is here for follow-up. She has been feeling fatigued for lately. She has not having heavy menses. Is wondering if she has anemia. Would like to get some blood work. Has ADHD, taking medications every day. Would like to get a refill. She mentioned she had preauthorization and needed for Adderall for which she got Adderall little late. But she has been taking and is working. Need refill. She has asthma, no wheezing right now using inhaler as needed. Prior to Admission medications    Medication Sig Start Date End Date Taking? Authorizing Provider   dextroamphetamine-amphetamine (ADDERALL) 10 mg tablet Take 1 Tablet by mouth two (2) times a day.  Max Daily Amount: 20 mg. 5/18/22  Yes Senthil Dempsey Diego Garcia MD   naproxen (NAPROSYN) 500 mg tablet Take 1 tablet by mouth twice daily as needed for pain 2/25/22  Yes Hopkins, Lenward Curling, NP   famotidine (PEPCID) 20 mg tablet Take 1 Tablet by mouth two (2) times a day. 1 tab po every day prn 12/14/21  Yes Cheryle Mix, MD   albuterol (PROVENTIL HFA, VENTOLIN HFA, PROAIR HFA) 90 mcg/actuation inhaler Take 1 Puff by inhalation every six (6) hours as needed for Wheezing. 10/27/21  Yes Cheryle Mix, MD   albuterol (PROVENTIL VENTOLIN) 2.5 mg /3 mL (0.083 %) nebu 3 mL by Nebulization route every six (6) hours as needed for Wheezing. 10/27/21  Yes Cheryle Mix, MD   cholecalciferol, vitamin D3, 12.5 mcg/5 mL (500 unit/5 mL) liqd 2 tsp BID 10/27/21  Yes Cheryle Mix, MD   Nebulizer & Compressor machine Use as directed 1/22/21  Yes Erin Campos NP   Nebulizer Accessories kit Use as directed 1/22/21  Yes Erin Campos NP   dextroamphetamine-amphetamine (ADDERALL) 10 mg tablet Take 1 Tablet by mouth two (2) times a day. Max Daily Amount: 20 mg. 3/18/22 3/18/22  Cheryle Mix, MD   dextroamphetamine-amphetamine (ADDERALL) 10 mg tablet Take 1 Tablet by mouth two (2) times a day. Max Daily Amount: 20 mg. 4/18/22 3/18/22  Cheryle Mix, MD   dextroamphetamine-amphetamine (ADDERALL) 10 mg tablet Take 1 Tablet by mouth two (2) times a day. Max Daily Amount: 20 mg. 1/14/22 3/18/22  Cheryle Mix, MD     Past Medical History:   Diagnosis Date    ADHD     Asthma     Depression     Headache     Ovarian cyst     Psychotic disorder ([facility] [address]        ROS significant for fatigue.     Objective:     Patient-Reported Vitals 3/18/2022   Patient-Reported Weight 110 lbs   Patient-Reported Height -   Patient-Reported LMP 3/18/22            Constitutional: [x] Appears well-developed and well-nourished [x] No apparent distress      [] Abnormal -     Mental status: [x] Alert and awake  [x] Oriented to person/place/time [x] Able to follow commands    [] Abnormal -       HENT: [x] Normocephalic, atraumatic  [] Abnormal -   [x] Mouth/Throat: Mucous membranes are moist    External Ears [x] Normal  [] Abnormal -    Neck: [x] No visualized mass [] Abnormal -     Pulmonary/Chest: [x] Respiratory effort normal   [x] No visualized signs of difficulty breathing or respiratory distress        [] Abnormal -      Musculoskeletal:   [x] Normal gait with no signs of ataxia         [x] Normal range of motion of neck        [] Abnormal -     Neurological:        [x] No Facial Asymmetry (Cranial nerve 7 motor function) (limited exam due to video visit)          [x] No gaze palsy        [] Abnormal -          Skin:        [x] No significant exanthematous lesions or discoloration noted on facial skin         [] Abnormal -            Psychiatric:       [x] Normal Affect [] Abnormal -   Stable ADHD. [x] No Hallucinations    Other pertinent observable physical exam findings:-        We discussed the expected course, resolution and complications of the diagnosis(es) in detail. Medication risks, benefits, costs, interactions, and alternatives were discussed as indicated. I advised her to contact the office if her condition worsens, changes or fails to improve as anticipated. She expressed understanding with the diagnosis(es) and plan. [place], was evaluated through a synchronous (real-time) audio-video encounter. The patient (or guardian if applicable) is aware that this is a billable service, which includes applicable co-pays. Verbal consent to proceed has been obtained. The visit was conducted pursuant to the emergency declaration under the [facility], [address] [place] [address] authority and the [employer] General Act. Patient identification was verified, and a caregiver was present when appropriate. The patient was located at home in a state where the provider was licensed to provide care.       Greg Kirk MD

## 2022-03-18 NOTE — PROGRESS NOTES
Health Maintenance Due   Topic Date Due    Hepatitis C Screening  Never done    COVID-19 Vaccine (1) Never done    Pneumococcal 0-64 years (1 of 2 - PPSV23) Never done    HPV Age 9Y-34Y (1 - 2-dose series) Never done    Pap Smear  Never done    Flu Vaccine (1) 09/01/2021    Depression Monitoring  01/22/2022       Chief Complaint   Patient presents with    Behavioral Problem    Fatigue    Asthma       1. Have you been to the ER, urgent care clinic since your last visit? Hospitalized since your last visit? No    2. Have you seen or consulted any other health care providers outside of the [address] [place] [address] since your last visit? Include any pap smears or colon screening. No    3) Do you have an Advance Directive on file? no    4) Are you interested in receiving information on Advance Directives? NO      Patient is accompanied by self I have received verbal consent from Katerina Sánchez to discuss any/all medical information while they are present in the room.

## 2022-03-19 PROBLEM — F32.1 MAJOR DEPRESSIVE DISORDER, SINGLE EPISODE, MODERATE WITH ANXIOUS DISTRESS (HCC): Status: ACTIVE | Noted: 2019-09-06

## 2022-03-19 PROBLEM — Z86.59 HISTORY OF ATTENTION DEFICIT HYPERACTIVITY DISORDER (ADHD): Status: ACTIVE | Noted: 2019-09-06

## 2022-03-24 ENCOUNTER — DOCUMENTATION ONLY (OUTPATIENT)
Dept: INTERNAL MEDICINE CLINIC | Age: 27
End: 2022-03-24

## 2022-03-24 NOTE — PROGRESS NOTES
Chief Complaint   Patient presents with    Prior Auth     Amphetamine-Dextroamphetamine 10MG tablets         Approved 3/24/22 to 3/24/23

## 2022-05-27 ENCOUNTER — TELEPHONE (OUTPATIENT)
Dept: INTERNAL MEDICINE CLINIC | Age: 27
End: 2022-05-27

## 2022-05-27 NOTE — TELEPHONE ENCOUNTER
Not vaccinated. Pt having covid symptoms.  Pt asking for advise on what to try over the counter and what symptoms to be concerned about  Scheduled f/u visit with Viral Bhakta 5/31/22

## 2022-05-27 NOTE — TELEPHONE ENCOUNTER
Pt states body aches, sore throat ,headaches and cough, little chest pains when sleeping and fatigue. pt states she has been taking Theraflu . Pt states she tested for Covid and she believes it's positive. I advised to take a variety of vitamins, ginger, drink plenty of fluids, use her inhaler or nebulizer as needed and Tynelol l sinus / cold and flu. Advised if symptoms worsen seek medical attention.

## 2022-06-01 DIAGNOSIS — N94.6 MENSTRUAL CRAMP: ICD-10-CM

## 2022-06-01 RX ORDER — NAPROXEN 500 MG/1
TABLET ORAL
Qty: 30 TABLET | Refills: 0 | Status: SHIPPED | OUTPATIENT
Start: 2022-06-01 | End: 2022-07-29

## 2022-07-29 DIAGNOSIS — N94.6 MENSTRUAL CRAMP: ICD-10-CM

## 2022-07-29 RX ORDER — NAPROXEN 500 MG/1
TABLET ORAL
Qty: 30 TABLET | Refills: 0 | Status: SHIPPED | OUTPATIENT
Start: 2022-07-29

## 2022-07-29 NOTE — TELEPHONE ENCOUNTER
Requested Prescriptions     Pending Prescriptions Disp Refills    naproxen (NAPROSYN) 500 mg tablet 30 Tablet 0     [address] [place] [address]  Phone: [phone] Fax: [phone]       Visit date not found is LAST OFFICE VISIT     No future appointments.

## 2022-08-01 RX ORDER — NAPROXEN 500 MG/1
500 TABLET ORAL
Qty: 30 TABLET | Refills: 0 | Status: SHIPPED | OUTPATIENT
Start: 2022-08-01

## 2022-08-10 ENCOUNTER — TELEPHONE (OUTPATIENT)
Dept: INTERNAL MEDICINE CLINIC | Age: 27
End: 2022-08-10

## 2022-08-10 NOTE — TELEPHONE ENCOUNTER
Ana Cristina Baumgarten was called and verbalized understanding on note below. Explained that she should present to [facility] for exam and follow up with Liliana Pham MD after.

## 2022-08-10 NOTE — TELEPHONE ENCOUNTER
Itchy hives that come and go in various parts of her body. Pt denies fever and has been taking benadryl for symptoms. This has been going on for last 3 weeks. Pt denies using new lotion, detergent or soap recently also denies starting new med in last 3 weeks. Pt advised to go to [facility]. Pt requested to speak with a nurse regarding her symptoms and if she should go to urgent care when a break out is occurring?

## 2022-08-22 ENCOUNTER — OFFICE VISIT (OUTPATIENT)
Dept: URGENT CARE | Age: 27
End: 2022-08-22
Payer: MEDICAID

## 2022-08-22 VITALS
TEMPERATURE: 98.1 F | DIASTOLIC BLOOD PRESSURE: 87 MMHG | SYSTOLIC BLOOD PRESSURE: 125 MMHG | RESPIRATION RATE: 15 BRPM | HEART RATE: 85 BPM | WEIGHT: 112 LBS | OXYGEN SATURATION: 97 % | BODY MASS INDEX: 22.62 KG/M2

## 2022-08-22 DIAGNOSIS — L50.1 IDIOPATHIC URTICARIA: Primary | ICD-10-CM

## 2022-08-22 PROCEDURE — 99213 OFFICE O/P EST LOW 20 MIN: CPT | Performed by: NURSE PRACTITIONER

## 2022-08-22 RX ORDER — CETIRIZINE HCL 10 MG
10 TABLET ORAL
Qty: 60 TABLET | Refills: 1 | Status: SHIPPED | OUTPATIENT
Start: 2022-08-22 | End: 2022-10-21

## 2022-08-22 NOTE — PROGRESS NOTES
Here for hives  Arms and back  No known cause  No new cosmetics  Rash comes and goes away  Not resolved with benadryl  Itchy non painful  Feels well otherwise without any fever, chills or URI symptoms  Doesn't really have active rash in office, possible small spot on forearm. Past Medical History:   Diagnosis Date    ADHD     Asthma     Depression     Headache     Ovarian cyst     Psychotic disorder ([facility] [address]         History reviewed. No pertinent surgical history. Family History   Problem Relation Age of Onset    No Known Problems Mother     Cancer Father         brain ca    Lung Disease Father         COPD        Social History     Socioeconomic History    Marital status: SINGLE     Spouse name: Not on file    Number of children: Not on file    Years of education: Not on file    Highest education level: Not on file   Occupational History    Not on file   Tobacco Use    Smoking status: Never    Smokeless tobacco: Never   Substance and Sexual Activity    Alcohol use: Never    Drug use: Never    Sexual activity: Never     Comment: single,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   Other Topics Concern    Not on file   Social History Narrative    ** Merged History Encounter **          Social Determinants of Health     Financial Resource Strain: Not on file   Food Insecurity: Not on file   Transportation Needs: Not on file   Physical Activity: Not on file   Stress: Not on file   Social Connections: Not on file   Intimate Partner Violence: Not on file   Housing Stability: Not on file                ALLERGIES: Patient has no known allergies. Review of Systems   All other systems reviewed and are negative. Vitals:    08/22/22 1726   BP: 125/87   Pulse: 85   Resp: 15   Temp: 98.1 °F (36.7 °C)   SpO2: 97%   Weight: 112 lb (50.8 kg)       Physical Exam  Vitals reviewed. Constitutional:       General: She is not in acute distress. Appearance: Normal appearance. She is not ill-appearing.    HENT:      Head: Normocephalic and atraumatic. Nose: No congestion or rhinorrhea. Cardiovascular:      Rate and Rhythm: Normal rate and regular rhythm. Pulmonary:      Effort: Pulmonary effort is normal. No respiratory distress. Breath sounds: Normal breath sounds. No stridor. No wheezing, rhonchi or rales. Skin:     Capillary Refill: Capillary refill takes less than 2 seconds. Comments: Single urticarial lesion left forearm. No other areas of rash or swelling noted. Neurological:      Mental Status: She is alert and oriented to person, place, and time. Psychiatric:         Mood and Affect: Mood normal.         Behavior: Behavior normal.         Thought Content: Thought content normal.       MDM     Differential Diagnosis; Clinical Impression; Plan:       CLINICAL IMPRESSION:  (L50.1) Idiopathic urticaria  (primary encounter diagnosis)    Orders Placed This Encounter      cetirizine (ZYRTEC) 10 mg tablet          Sig: Take 1 Tablet by mouth nightly for 60 days. Dispense:  60 Tablet          Refill:  1      Plan:  Start zyrtec  Follow up with allergist within 2 months        We have reviewed concerning signs/symptoms, normal vs abnormal progression of medical condition and when to seek immediate medical attention. Schedule with PCP or Urgent Care immediately for worsening or new symptoms. You should see your PCP for updates on your routine health maintenance.              Procedures

## 2022-10-14 DIAGNOSIS — J45.909 ASTHMA, UNSPECIFIED ASTHMA SEVERITY, UNSPECIFIED WHETHER COMPLICATED, UNSPECIFIED WHETHER PERSISTENT: ICD-10-CM

## 2022-10-14 DIAGNOSIS — R06.2 WHEEZING: ICD-10-CM

## 2022-10-14 DIAGNOSIS — Z86.59 HISTORY OF ATTENTION DEFICIT HYPERACTIVITY DISORDER (ADHD): ICD-10-CM

## 2022-10-14 DIAGNOSIS — N94.6 MENSTRUAL CRAMP: ICD-10-CM

## 2022-10-14 NOTE — TELEPHONE ENCOUNTER
Requested Prescriptions     Pending Prescriptions Disp Refills    albuterol (PROVENTIL HFA, VENTOLIN HFA, PROAIR HFA) 90 mcg/actuation inhaler 1 Each 1     Sig: Take 1 Puff by inhalation every six (6) hours as needed for Wheezing. albuterol (PROVENTIL VENTOLIN) 2.5 mg /3 mL (0.083 %) nebu       Sig: 3 mL by Nebulization route every six (6) hours as needed for Wheezing. dextroamphetamine-amphetamine (ADDERALL) 10 mg tablet 60 Tablet 0     Sig: Take 1 Tablet by mouth two (2) times a day. Max Daily Amount: 20 mg.    naproxen (NAPROSYN) 500 mg tablet 30 Tablet 0     Sig: Take 1 Tablet by mouth two (2) times daily as needed for Pain. [address] [place] [address]  Phone: [phone] Fax: [phone]       3/18/2022 is LAST OFFICE VISIT     No future appointments.

## 2022-10-17 RX ORDER — ALBUTEROL SULFATE 0.83 MG/ML
2.5 SOLUTION RESPIRATORY (INHALATION)
OUTPATIENT
Start: 2022-10-17

## 2022-10-17 RX ORDER — ALBUTEROL SULFATE 90 UG/1
1 AEROSOL, METERED RESPIRATORY (INHALATION)
Qty: 1 EACH | Refills: 1 | OUTPATIENT
Start: 2022-10-17

## 2022-10-17 RX ORDER — DEXTROAMPHETAMINE SACCHARATE, AMPHETAMINE ASPARTATE, DEXTROAMPHETAMINE SULFATE AND AMPHETAMINE SULFATE 2.5; 2.5; 2.5; 2.5 MG/1; MG/1; MG/1; MG/1
10 TABLET ORAL 2 TIMES DAILY
Qty: 60 TABLET | Refills: 0 | OUTPATIENT
Start: 2022-10-17

## 2022-10-17 RX ORDER — NAPROXEN 500 MG/1
500 TABLET ORAL
Qty: 30 TABLET | Refills: 0 | OUTPATIENT
Start: 2022-10-17

## 2022-11-04 ENCOUNTER — APPOINTMENT (OUTPATIENT)
Dept: GENERAL RADIOLOGY | Age: 27
End: 2022-11-04
Attending: STUDENT IN AN ORGANIZED HEALTH CARE EDUCATION/TRAINING PROGRAM
Payer: MEDICAID

## 2022-11-04 ENCOUNTER — HOSPITAL ENCOUNTER (EMERGENCY)
Age: 27
Discharge: HOME | End: 2022-11-04
Attending: EMERGENCY MEDICINE
Payer: MEDICAID

## 2022-11-04 VITALS
TEMPERATURE: 97.3 F | BODY MASS INDEX: 22.98 KG/M2 | HEIGHT: 59 IN | OXYGEN SATURATION: 99 % | HEART RATE: 94 BPM | DIASTOLIC BLOOD PRESSURE: 80 MMHG | WEIGHT: 114 LBS | SYSTOLIC BLOOD PRESSURE: 120 MMHG | RESPIRATION RATE: 15 BRPM

## 2022-11-04 DIAGNOSIS — R07.9 CHEST PAIN, UNSPECIFIED TYPE: Primary | ICD-10-CM

## 2022-11-04 LAB
ALBUMIN SERPL-MCNC: 3.6 G/DL (ref 3.5–5)
ALBUMIN/GLOB SERPL: 0.8 {RATIO} (ref 1.1–2.2)
ALP SERPL-CCNC: 52 U/L (ref 45–117)
ALT SERPL-CCNC: 64 U/L (ref 12–78)
ANION GAP SERPL CALC-SCNC: 3 MMOL/L (ref 5–15)
AST SERPL-CCNC: 36 U/L (ref 15–37)
ATRIAL RATE: 90 BPM
BASOPHILS # BLD: 0 K/UL (ref 0–0.1)
BASOPHILS NFR BLD: 0 % (ref 0–1)
BILIRUB SERPL-MCNC: 0.3 MG/DL (ref 0.2–1)
BUN SERPL-MCNC: 12 MG/DL (ref 6–20)
BUN/CREAT SERPL: 17 (ref 12–20)
CALCIUM SERPL-MCNC: 8.6 MG/DL (ref 8.5–10.1)
CALCULATED P AXIS, ECG09: 61 DEGREES
CALCULATED R AXIS, ECG10: 88 DEGREES
CALCULATED T AXIS, ECG11: 62 DEGREES
CHLORIDE SERPL-SCNC: 106 MMOL/L (ref 97–108)
CO2 SERPL-SCNC: 25 MMOL/L (ref 21–32)
COMMENT, HOLDF: NORMAL
CREAT SERPL-MCNC: 0.72 MG/DL (ref 0.55–1.02)
DIAGNOSIS, 93000: NORMAL
DIFFERENTIAL METHOD BLD: ABNORMAL
EOSINOPHIL # BLD: 0 K/UL (ref 0–0.4)
EOSINOPHIL NFR BLD: 0 % (ref 0–7)
ERYTHROCYTE [DISTWIDTH] IN BLOOD BY AUTOMATED COUNT: 12.3 % (ref 11.5–14.5)
GLOBULIN SER CALC-MCNC: 4.4 G/DL (ref 2–4)
GLUCOSE SERPL-MCNC: 88 MG/DL (ref 65–100)
HCT VFR BLD AUTO: 39.7 % (ref 35–47)
HGB BLD-MCNC: 12.9 G/DL (ref 11.5–16)
IMM GRANULOCYTES # BLD AUTO: 0.1 K/UL (ref 0–0.04)
IMM GRANULOCYTES NFR BLD AUTO: 1 % (ref 0–0.5)
LYMPHOCYTES # BLD: 0.7 K/UL (ref 0.8–3.5)
LYMPHOCYTES NFR BLD: 12 % (ref 12–49)
MCH RBC QN AUTO: 30.4 PG (ref 26–34)
MCHC RBC AUTO-ENTMCNC: 32.5 G/DL (ref 30–36.5)
MCV RBC AUTO: 93.4 FL (ref 80–99)
MONOCYTES # BLD: 0.5 K/UL (ref 0–1)
MONOCYTES NFR BLD: 8 % (ref 5–13)
NEUTS SEG # BLD: 4.5 K/UL (ref 1.8–8)
NEUTS SEG NFR BLD: 79 % (ref 32–75)
NRBC # BLD: 0 K/UL (ref 0–0.01)
NRBC BLD-RTO: 0 PER 100 WBC
P-R INTERVAL, ECG05: 144 MS
PLATELET # BLD AUTO: 255 K/UL (ref 150–400)
PMV BLD AUTO: 10.8 FL (ref 8.9–12.9)
POTASSIUM SERPL-SCNC: 3.7 MMOL/L (ref 3.5–5.1)
PROT SERPL-MCNC: 8 G/DL (ref 6.4–8.2)
Q-T INTERVAL, ECG07: 340 MS
QRS DURATION, ECG06: 80 MS
QTC CALCULATION (BEZET), ECG08: 415 MS
RBC # BLD AUTO: 4.25 M/UL (ref 3.8–5.2)
RBC MORPH BLD: ABNORMAL
SAMPLES BEING HELD,HOLD: NORMAL
SODIUM SERPL-SCNC: 134 MMOL/L (ref 136–145)
TROPONIN-HIGH SENSITIVITY: 4 NG/L (ref 0–51)
VENTRICULAR RATE, ECG03: 90 BPM
WBC # BLD AUTO: 5.8 K/UL (ref 3.6–11)

## 2022-11-04 PROCEDURE — 99285 EMERGENCY DEPT VISIT HI MDM: CPT

## 2022-11-04 PROCEDURE — 80053 COMPREHEN METABOLIC PANEL: CPT

## 2022-11-04 PROCEDURE — 85025 COMPLETE CBC W/AUTO DIFF WBC: CPT

## 2022-11-04 PROCEDURE — 36415 COLL VENOUS BLD VENIPUNCTURE: CPT

## 2022-11-04 PROCEDURE — 93005 ELECTROCARDIOGRAM TRACING: CPT

## 2022-11-04 PROCEDURE — 71046 X-RAY EXAM CHEST 2 VIEWS: CPT

## 2022-11-04 PROCEDURE — 84484 ASSAY OF TROPONIN QUANT: CPT

## 2022-11-04 NOTE — Clinical Note
[address]  [address] [facility] [address]  [phone]    Work/School Note    Date: 11/4/2022    To Whom It May concern:      Gal Sierra was seen and treated today in the emergency room by the following provider(s):  Attending Provider: Melani Johns MD  Physician Assistant: Fareed Krishna. Gal Sierra is excused from work/school on 11/04/22. She is clear to return to work/school on 11/05/22.         Sincerely,          ADRIAN Reeves

## 2022-11-04 NOTE — ED PROVIDER NOTES
70-year-old female with history of ADHD, asthma, depression, ovarian cysts presents to ED with several hours of chest pain. Patient reports that starting around 5 AM this morning she started having midsternal chest pain radiating to the back. She describes the pain as sharp in nature. Denies any associated SOB, dizziness, lightheadedness, N/V/D, cough, nasal congestion. She does note that she has had similar symptoms like this before and she had come to this ED where she has been told that it was likely GERD, she notes that this is more painful than previous episodes. Denies any cardiac history and has not taken anything for her pain. Chest Pain (Angina)   Pertinent negatives include no dizziness, no fever, no headaches, no nausea, no shortness of breath and no vomiting. Past Medical History:   Diagnosis Date    ADHD     Asthma     Depression     Headache     Ovarian cyst     Psychotic disorder ([facility] [address]        No past surgical history on file.       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 Review of Systems   Constitutional:  Negative for fever. HENT:  Negative for congestion and sinus pressure. Respiratory:  Negative for shortness of breath. Cardiovascular:  Positive for chest pain. Gastrointestinal:  Negative for nausea and vomiting. Genitourinary:  Negative for dysuria. Musculoskeletal:  Negative for myalgias. Neurological:  Negative for dizziness and headaches. Hematological:  Negative for adenopathy. Psychiatric/Behavioral:  The patient is not nervous/anxious. All other systems reviewed and are negative. Vitals:    11/04/22 0802   BP: 120/80   Pulse: 94   Resp: 15   Temp: 97.3 °F (36.3 °C)   SpO2: 99%   Weight: 51.7 kg (114 lb)   Height: 4' 11\" (1.499 m)            Physical Exam  Vitals and nursing note reviewed. Constitutional:       General: She is not in acute distress. Appearance: Normal appearance. She is normal weight. HENT:      Head: Normocephalic and atraumatic. Eyes:      Extraocular Movements: Extraocular movements intact. Pupils: Pupils are equal, round, and reactive to light. Cardiovascular:      Rate and Rhythm: Normal rate and regular rhythm. Heart sounds: Normal heart sounds. Pulmonary:      Breath sounds: Normal breath sounds. Abdominal:      Palpations: Abdomen is soft. Tenderness: There is no abdominal tenderness. Lymphadenopathy:      Cervical: No cervical adenopathy. Skin:     General: Skin is warm and dry. Neurological:      General: No focal deficit present. Mental Status: She is alert and oriented to person, place, and time. Psychiatric:         Mood and Affect: Mood normal.         Behavior: Behavior normal.         Thought Content:  Thought content normal.        MDM  Number of Diagnoses or Management Options  Chest pain, unspecified type  Diagnosis management comments: 62-year-old female with history of ADHD, asthma, depression, ovarian cysts presents to ED with several hours of chest pain. Vital signs stable in triage. Physical exam unremarkable with lungs clear to auscultation bilaterally and heart sounds normal.  See below for EKG interpretation. Chest x-ray showed no acute abnormality. Labs unremarkable with troponin at 4. Suspect GERD versus pleuritis versus costochondritis. Patient did not want anything for her symptoms while in ED. Patient will be discharged with instructions for conservative care, follow-up and return precautions. Given suggestion for cardiologist.       Amount and/or Complexity of Data Reviewed  Clinical lab tests: reviewed  Tests in the radiology section of CPT®: reviewed  Tests in the medicine section of CPT®: reviewed      ED Course as of 11/04/22 0827   Fri Nov 04, 2022   0806 ED EKG interpretation:  Rhythm: normal sinus rhythm; and regular . Rate (approx.): 90; Axis: normal; P wave: normal; QRS interval: normal ; ST/T wave: normal; Other findings: no ischemia.  This EKG was interpreted by Willow Conrad MD,ED Provider.   [LA]      ED Course User Index  [LA] Jaison Larry MD       Procedures

## 2022-11-04 NOTE — ED TRIAGE NOTES
Pt arrives from home for midsternal chest and back pain beginning at 0500 this morning. Denies cardiac history.

## 2023-01-17 ENCOUNTER — VIRTUAL VISIT (OUTPATIENT)
Dept: INTERNAL MEDICINE CLINIC | Age: 28
End: 2023-01-17
Payer: MEDICAID

## 2023-01-17 DIAGNOSIS — G44.211 INTRACTABLE EPISODIC TENSION-TYPE HEADACHE: Primary | ICD-10-CM

## 2023-01-17 DIAGNOSIS — Z86.59 HISTORY OF ATTENTION DEFICIT HYPERACTIVITY DISORDER (ADHD): ICD-10-CM

## 2023-01-17 DIAGNOSIS — N83.202 CYST OF LEFT OVARY: ICD-10-CM

## 2023-01-17 DIAGNOSIS — N94.6 MENSTRUAL CRAMP: ICD-10-CM

## 2023-01-17 PROCEDURE — 99214 OFFICE O/P EST MOD 30 MIN: CPT | Performed by: INTERNAL MEDICINE

## 2023-01-17 RX ORDER — NAPROXEN 500 MG/1
500 TABLET ORAL
Qty: 30 TABLET | Refills: 0 | Status: SHIPPED | OUTPATIENT
Start: 2023-01-17

## 2023-01-17 RX ORDER — CETIRIZINE HYDROCHLORIDE 10 MG/1
TABLET ORAL
COMMUNITY
Start: 2023-01-05

## 2023-01-17 RX ORDER — DEXTROAMPHETAMINE SACCHARATE, AMPHETAMINE ASPARTATE, DEXTROAMPHETAMINE SULFATE AND AMPHETAMINE SULFATE 2.5; 2.5; 2.5; 2.5 MG/1; MG/1; MG/1; MG/1
10 TABLET ORAL 2 TIMES DAILY
Qty: 60 TABLET | Refills: 0 | Status: SHIPPED | OUTPATIENT
Start: 2023-01-17

## 2023-01-17 NOTE — PROGRESS NOTES
Jennifer Collier is a 24 y.o. female who was seen by synchronous (real-time) audio-video technology on 1/17/2023 for Depression, Attention Deficit Disorder, and Headache        Assessment & Plan:   Diagnoses and all orders for this visit:    1. Intractable episodic tension-type headache    Need to use heating pad and massage in the neck area. Need to change pillow. We will try,  -     acetaminophen-caffeine 500-65 mg (EXCEDRINE TENSION HEADACHE) 500-65 mg tab; Take 1 Tablet by mouth every eight (8) hours as needed for Headache. 2. History of attention deficit hyperactivity disorder (ADHD)    She has prescribed albuterol and twice a day. But not able to take twice a day. Advised her to start once a day and if needed sometimes she can use an afternoon. We will give,   dextroamphetamine-amphetamine (ADDERALL) 10 mg tablet; Take 1 Tablet by mouth two (2) times a day. Max Daily Amount: 20 mg.    .   I have reviewed this patient's report generated by the [employer] which does not demonstrate aberrancies and inconsistencies with regard to the historical prescribing of controlled medications to this patient by other providers    She need to get urine tox screen done few weeks in order to get it approved. We will follow-up in 2 months. 3. Menstrual cramp    Heating pad and massage. We will give,  -     naproxen (NAPROSYN) 500 mg tablet; Take 1 Tablet by mouth two (2) times daily as needed for Pain. For menstrual cramp    4. Cyst of left ovary  Had left ovarian complex cyst.  She did not follow-up with her gynecologist Dr. Indio Hodgson for past 2 years. Advised her to see gynecologist on repeat ultrasound of the ovary. I spent at least 30 minutes on this visit with this established patient. Subjective:     Margaret Duenas is here for follow-up. She is working part-time and also student. Has focus problem, diagnosed with ADHD Soliday years back. Started on Ritalin.   She is not able to take Ritalin twice a day which makes her insomnia at night. Would like to get a refill. Reported headache, headache mostly in forehead and radiating to both side neck. Has some stiffness. Has stress present. No photophobia, no nausea or vomiting associated with headache. Report menstrual cramp several days before menstruation and continued at first week. I used to take naproxen, need refill. She had a complex cyst on left ovary several years back. Did not follow-up with gynecologist.  Prior to Admission medications    Medication Sig Start Date End Date Taking? Authorizing Provider   cetirizine (ZYRTEC) 10 mg tablet TAKE 1 TABLET BY MOUTH NIGHTLY FOR 60 DAYS 1/5/23  Yes Provider, Historical   acetaminophen-caffeine 500-65 mg (EXCEDRINE TENSION HEADACHE) 500-65 mg tab Take 1 Tablet by mouth every eight (8) hours as needed for Headache. 1/17/23  Yes Jolene Taylor MD   dextroamphetamine-amphetamine (ADDERALL) 10 mg tablet Take 1 Tablet by mouth two (2) times a day. Max Daily Amount: 20 mg. 1/17/23  Yes Jolene Taylor MD   naproxen (NAPROSYN) 500 mg tablet Take 1 Tablet by mouth two (2) times daily as needed for Pain. For menstrual cramp 1/17/23  Yes Jolene Taylor MD   famotidine (PEPCID) 20 mg tablet Take 1 Tablet by mouth two (2) times a day. 1 tab po every day prn 12/14/21  Yes Jolene Taylor MD   albuterol (PROVENTIL HFA, VENTOLIN HFA, PROAIR HFA) 90 mcg/actuation inhaler Take 1 Puff by inhalation every six (6) hours as needed for Wheezing. 10/27/21  Yes Jolene Taylor MD   albuterol (PROVENTIL VENTOLIN) 2.5 mg /3 mL (0.083 %) nebu 3 mL by Nebulization route every six (6) hours as needed for Wheezing.  10/27/21  Yes Jolene Taylor MD   cholecalciferol, vitamin D3, 12.5 mcg/5 mL (500 unit/5 mL) liqd 2 tsp BID 10/27/21  Yes Jolene Taylor MD   Nebulizer & Compressor machine Use as directed 1/22/21  Yes Tenny Fadia, NP   Nebulizer Accessories kit Use as directed 1/22/21  Yes Hanane Loaiza NP   naproxen (NAPROSYN) 500 mg tablet Take 1 Tablet by mouth two (2) times daily as needed for Pain. 8/1/22 1/17/23  Jan Kyle MD   naproxen (NAPROSYN) 500 mg tablet Take 1 tablet by mouth twice daily as needed for pain 7/29/22 1/17/23  Jamal Olvera NP   dextroamphetamine-amphetamine (ADDERALL) 10 mg tablet Take 1 Tablet by mouth two (2) times a day. Max Daily Amount: 20 mg. Patient not taking: Reported on 1/17/2023 5/18/22 1/17/23  Jan Kyle MD     Past Medical History:   Diagnosis Date    ADHD     Asthma     Depression     Headache     Ovarian cyst     Psychotic disorder ([facility] [address]        ROS significant for headache and menstrual cramp. Also ADHD.     Objective:     Patient-Reported Vitals 3/18/2022   Patient-Reported Weight 110 lbs   Patient-Reported Height -   Patient-Reported LMP 3/18/22            Constitutional: [x] Appears well-developed and well-nourished [x] No apparent distress      [] Abnormal -     Mental status: [x] Alert and awake  [x] Oriented to person/place/time [x] Able to follow commands    [] Abnormal -     Eyes:   EOM    [x]  Normal    [] Abnormal -   Sclera  [x]  Normal    [] Abnormal -          Discharge [x]  None visible   [] Abnormal -     HENT: [x] Normocephalic, atraumatic  [] Abnormal -   [x] Mouth/Throat: Mucous membranes are moist    External Ears [x] Normal  [] Abnormal -    Neck: [x] No visualized mass [] Abnormal -     Pulmonary/Chest: [x] Respiratory effort normal   [x] No visualized signs of difficulty breathing or respiratory distress        [] Abnormal -      Musculoskeletal:   [x] Normal gait with no signs of ataxia         [x] Normal range of motion of neck        [] Abnormal -     Neurological:        [x] No Facial Asymmetry (Cranial nerve 7 motor function) (limited exam due to video visit)          [x] No gaze palsy        [] Abnormal -          Skin:        [x] No significant exanthematous lesions or discoloration noted on facial skin         [] Abnormal -            Psychiatric: [x] Normal Affect [] Abnormal -   ADHD present. [x] No Hallucinations    Other pertinent observable physical exam findings:-        We discussed the expected course, resolution and complications of the diagnosis(es) in detail. Medication risks, benefits, costs, interactions, and alternatives were discussed as indicated. I advised her to contact the office if her condition worsens, changes or fails to improve as anticipated. She expressed understanding with the diagnosis(es) and plan. Marilyn Beck, was evaluated through a synchronous (real-time) audio-video encounter. The patient (or guardian if applicable) is aware that this is a billable service, which includes applicable co-pays. This Virtual Visit was conducted with patient's (and/or legal guardian's) consent. The visit was conducted pursuant to the emergency declaration under the [facility], [address] [place] [address] authority and the [employer] General Act. Patient identification was verified, and a caregiver was present when appropriate. The patient was located at: Home: [address]  The provider was located at:  Facility (Appt Department): [address] [place] [address] [id number]  Simone Sherwood MD

## 2023-02-16 ENCOUNTER — OFFICE VISIT (OUTPATIENT)
Dept: INTERNAL MEDICINE CLINIC | Age: 28
End: 2023-02-16
Payer: MEDICAID

## 2023-02-16 VITALS
OXYGEN SATURATION: 16 % | RESPIRATION RATE: 16 BRPM | BODY MASS INDEX: 24.23 KG/M2 | HEIGHT: 59 IN | SYSTOLIC BLOOD PRESSURE: 98 MMHG | HEART RATE: 78 BPM | WEIGHT: 120.2 LBS | DIASTOLIC BLOOD PRESSURE: 62 MMHG | TEMPERATURE: 96.9 F

## 2023-02-16 DIAGNOSIS — J45.909 MODERATE ASTHMA, UNSPECIFIED WHETHER COMPLICATED, UNSPECIFIED WHETHER PERSISTENT: ICD-10-CM

## 2023-02-16 DIAGNOSIS — Z23 ENCOUNTER FOR IMMUNIZATION: ICD-10-CM

## 2023-02-16 DIAGNOSIS — F32.1 MAJOR DEPRESSIVE DISORDER, SINGLE EPISODE, MODERATE WITH ANXIOUS DISTRESS (HCC): Primary | ICD-10-CM

## 2023-02-16 DIAGNOSIS — Z86.59 HISTORY OF ATTENTION DEFICIT HYPERACTIVITY DISORDER (ADHD): ICD-10-CM

## 2023-02-16 DIAGNOSIS — F90.9 ATTENTION DEFICIT HYPERACTIVITY DISORDER (ADHD), UNSPECIFIED ADHD TYPE: ICD-10-CM

## 2023-02-16 PROCEDURE — 99213 OFFICE O/P EST LOW 20 MIN: CPT | Performed by: INTERNAL MEDICINE

## 2023-02-16 PROCEDURE — 90686 IIV4 VACC NO PRSV 0.5 ML IM: CPT | Performed by: INTERNAL MEDICINE

## 2023-02-16 RX ORDER — DEXTROAMPHETAMINE SACCHARATE, AMPHETAMINE ASPARTATE, DEXTROAMPHETAMINE SULFATE AND AMPHETAMINE SULFATE 2.5; 2.5; 2.5; 2.5 MG/1; MG/1; MG/1; MG/1
10 TABLET ORAL DAILY
Qty: 30 TABLET | Refills: 0 | Status: SHIPPED | OUTPATIENT
Start: 2023-03-16 | End: 2023-02-16 | Stop reason: SDUPTHER

## 2023-02-16 RX ORDER — DEXTROAMPHETAMINE SACCHARATE, AMPHETAMINE ASPARTATE, DEXTROAMPHETAMINE SULFATE AND AMPHETAMINE SULFATE 2.5; 2.5; 2.5; 2.5 MG/1; MG/1; MG/1; MG/1
10 TABLET ORAL DAILY
Qty: 30 TABLET | Refills: 0 | Status: SHIPPED | OUTPATIENT
Start: 2023-04-16

## 2023-02-16 NOTE — PROGRESS NOTES
HISTORY OF PRESENT ILLNESS  Catherine Graves is a 24 y.o. female here for follow-up. She has ADHD, started on Adderall 10 mg twice a day. .  Patient is not able to tolerate it twice a day. She is only taking once a day. She thinks her focus is better. Needed refill. Need urine drug test and controlled substance contract form to sign. Depression seems under control. Not taking medication. 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 Very active. Need flu shot. Attention Deficit Disorder      Review of Systems   Constitutional: Negative. HENT: Negative. Eyes: Negative. Respiratory: Negative. Cardiovascular: Negative. Gastrointestinal: Negative. Genitourinary: Negative. Skin: Negative. Neurological: Negative. Psychiatric/Behavioral: Negative. Physical Exam  Constitutional:       Appearance: Normal appearance. She is normal weight. HENT:      Right Ear: Tympanic membrane normal.      Left Ear: Tympanic membrane normal.      Nose: Nose normal.      Mouth/Throat:      Mouth: Mucous membranes are moist.   Eyes:      Extraocular Movements: Extraocular movements intact. Conjunctiva/sclera: Conjunctivae normal.      Pupils: Pupils are equal, round, and reactive to light. Cardiovascular:      Rate and Rhythm: Normal rate and regular rhythm. Pulses: Normal pulses. Heart sounds: Normal heart sounds. Pulmonary:      Effort: Pulmonary effort is normal.      Breath sounds: Normal breath sounds. Abdominal:      General: Abdomen is flat. Bowel sounds are normal.      Palpations: Abdomen is soft. Musculoskeletal:         General: Normal range of motion. Cervical back: Normal range of motion and neck supple. Skin:     General: Skin is warm. Neurological:      General: No focal deficit present. Mental Status: She is alert and oriented to person, place, and time. Mental status is at baseline.    Psychiatric: Mood and Affect: Mood normal.         Behavior: Behavior normal.         Thought Content: Thought content normal.      Comments: Stable depression. ADHD present. ASSESSMENT and PLAN  Diagnoses and all orders for this visit:    1. Major depressive disorder, single episode, moderate with anxious distress (HCC)  Not on medication  . Depression is stable. 2. Moderate asthma, unspecified whether complicated, unspecified whether persistent  Using albuterol inhaler as needed. 3. Attention deficit hyperactivity disorder (ADHD), unspecified ADHD type    She is not able to take Adderall 10 mg twice a day. She is only using it once a day. We will continue prescription this month and then we will give 2 more months refill. We will check,  -     COMPLIANCE DRUG SCREEN/PRESCRIPTION MONITORING; Future    4. History of attention deficit hyperactivity disorder (ADHD)  -     dextroamphetamine-amphetamine (ADDERALL) 10 mg tablet; Take 1 Tablet by mouth daily. Max Daily Amount: 10 mg.    5. Encounter for immunization    We will give,  -     INFLUENZA, FLUARIX, FLULAVAL, FLUZONE (AGE 6 MO+), AFLURIA(AGE 3Y+) IM, PF, 0.5 ML   Discussed expected course/resolution/complications of diagnosis in detail with patient. Medication risks/benefits/costs/interactions/alternatives discussed with patient. Discussed COVID-19 infection precaution with patient. Pt was given an after visit summary which includes diagnoses, current medications & vitals. Pt expressed understanding with the diagnosis and plan.

## 2023-04-18 ENCOUNTER — OFFICE VISIT (OUTPATIENT)
Dept: OBGYN CLINIC | Age: 28
End: 2023-04-18
Payer: MEDICAID

## 2023-04-18 VITALS — BODY MASS INDEX: 24.8 KG/M2 | SYSTOLIC BLOOD PRESSURE: 118 MMHG | DIASTOLIC BLOOD PRESSURE: 64 MMHG | WEIGHT: 122.8 LBS

## 2023-04-18 DIAGNOSIS — L30.9 ECZEMA, UNSPECIFIED TYPE: Primary | ICD-10-CM

## 2023-04-18 PROCEDURE — 99213 OFFICE O/P EST LOW 20 MIN: CPT | Performed by: OBSTETRICS & GYNECOLOGY

## 2023-04-18 NOTE — PROGRESS NOTES
Breast Problem Evaluation    Chief Complaint   Patient presents with    Breast Problem       Stephen Perez is a 24 y.o. female BLACK/ who presents with concern over left breast lesion. She notes that she noticed a bruise on her left breast approximately 1 month ago when she was getting out of the shower. The bruise has continued to persist. She does not have any pain in the area and has never had pain in that area. She notes that in the last week, she feels as if there is a second bruise (still on the left breast). She does have a history of hives and takes zyrtec daily. There is not a family history of breast cancer in a first and second degree relative. She does have a paternal history of brain cancer. Past Medical History:   Diagnosis Date    ADHD     Asthma     Depression     Headache     Ovarian cyst     Psychotic disorder ([facility] [address]      History reviewed. No pertinent surgical history. Social History     Occupational History    Not on file   Tobacco Use    Smoking status: Never    Smokeless tobacco: Never   Vaping Use    Vaping Use: Never used   Substance and Sexual Activity    Alcohol use: Never    Drug use: Never    Sexual activity: Never     Comment: single,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     Family History   Problem Relation Age of Onset    No Known Problems Mother     Cancer Father         brain ca    Lung Disease Father         COPD       No Known Allergies  Prior to Admission medications    Medication Sig Start Date End Date Taking? Authorizing Provider   naproxen (NAPROSYN) 500 mg tablet Take 1 Tablet by mouth two (2) times daily as needed for Pain. For menstrual cramp 2/16/23  Yes Sage Smart MD   dextroamphetamine-amphetamine (ADDERALL) 10 mg tablet Take 1 Tablet by mouth daily.  Max Daily Amount: 10 mg. 4/16/23  Yes Sage Smart MD   cetirizine (ZYRTEC) 10 mg tablet TAKE 1 TABLET BY MOUTH NIGHTLY FOR 60 DAYS 1/5/23  Yes Provider, Historical   famotidine (PEPCID) 20 mg tablet Take 1 Tablet by mouth two (2) times a day. 1 tab po every day prn 12/14/21  Yes Stacey Garcia MD   albuterol (PROVENTIL HFA, VENTOLIN HFA, PROAIR HFA) 90 mcg/actuation inhaler Take 1 Puff by inhalation every six (6) hours as needed for Wheezing. 10/27/21  Yes Stacey Garcia MD   albuterol (PROVENTIL VENTOLIN) 2.5 mg /3 mL (0.083 %) nebu 3 mL by Nebulization route every six (6) hours as needed for Wheezing. 10/27/21  Yes Stacey Garcia MD   cholecalciferol, vitamin D3, 12.5 mcg/5 mL (500 unit/5 mL) liqd 2 tsp BID 10/27/21  Yes Stacey Garcia MD   Nebulizer & Compressor machine Use as directed 1/22/21  Yes Sacha Johansen NP   Nebulizer Accessories kit Use as directed 1/22/21  Yes Sacha BenitezntAWAIS   acetaminophen-caffeine 500-65 mg (EXCEDRINE TENSION HEADACHE) 500-65 mg tab Take 1 Tablet by mouth every eight (8) hours as needed for Headache. Patient not taking: Reported on 4/18/2023 1/17/23   Stacey Garcia MD        Physical Exam:  Visit Vitals  /64 (BP 1 Location: Left arm)   Wt 122 lb 12.8 oz (55.7 kg)   LMP 04/10/2023 (Exact Date)   BMI 24.80 kg/m²       Constitutional  Appearance: well-nourished, well developed, alert, in no acute distress    HENT  Head and Face: appears normal    Breasts  Inspection of Breasts: breasts symmetrical, no skin changes, no discharge present, nipple appearance normal, no skin retraction present, left breast dime sized lesion located on the upper breast, scaling, darkened skin, looks most consistent with eczema  Palpation of Breasts and Axillae: no masses present on palpation, no breast tenderness   Axillary Lymph Nodes: no lymphadenopathy present    Skin  General Inspection: no rash, no lesions identified    Neurologic/Psychiatric  Mental Status:  Orientation: grossly oriented to person, place and time  Mood and Affect: mood normal, affect appropriate      Assessment/Plan:  Discussed lesion looks most consistent with eczematous lesion.  Recommend hydrocortisone cream to area, keep area clean and dry, vaseline barrier protection  Follow up if no improvement     Daysi Quick MD

## 2023-05-22 DIAGNOSIS — Z86.59 HISTORY OF ATTENTION DEFICIT HYPERACTIVITY DISORDER (ADHD): ICD-10-CM

## 2023-05-22 DIAGNOSIS — F90.9 ATTENTION DEFICIT HYPERACTIVITY DISORDER (ADHD), UNSPECIFIED ADHD TYPE: ICD-10-CM

## 2023-05-22 DIAGNOSIS — T78.40XD ALLERGY, SUBSEQUENT ENCOUNTER: Primary | ICD-10-CM

## 2023-05-22 DIAGNOSIS — G44.211 EPISODIC TENSION-TYPE HEADACHE, INTRACTABLE: ICD-10-CM

## 2023-05-22 RX ORDER — NAPROXEN 500 MG/1
500 TABLET ORAL 2 TIMES DAILY PRN
Qty: 60 TABLET | Refills: 0 | Status: SHIPPED | OUTPATIENT
Start: 2023-05-22

## 2023-05-22 RX ORDER — CETIRIZINE HYDROCHLORIDE 10 MG/1
TABLET ORAL
Qty: 90 TABLET | Refills: 1 | Status: SHIPPED | OUTPATIENT
Start: 2023-05-22

## 2023-05-22 RX ORDER — DEXTROAMPHETAMINE SACCHARATE, AMPHETAMINE ASPARTATE, DEXTROAMPHETAMINE SULFATE AND AMPHETAMINE SULFATE 2.5; 2.5; 2.5; 2.5 MG/1; MG/1; MG/1; MG/1
10 TABLET ORAL DAILY
Qty: 30 TABLET | Refills: 0 | OUTPATIENT
Start: 2023-05-22 | End: 2023-06-21

## 2023-05-31 ENCOUNTER — TELEPHONE (OUTPATIENT)
Age: 28
End: 2023-05-31

## 2023-05-31 NOTE — TELEPHONE ENCOUNTER
Jimenez Hernandez (Armendariz: [id number]) - [id number]  Amphetamine-Dextroamphetamine 10MG tablets  Status: Sent To Plan  Created:  May 31st, 2023 [id number]  Sent: May 31st, 2023

## 2023-06-30 ENCOUNTER — OFFICE VISIT (OUTPATIENT)
Age: 28
End: 2023-06-30

## 2023-06-30 VITALS
TEMPERATURE: 98.4 F | OXYGEN SATURATION: 100 % | RESPIRATION RATE: 16 BRPM | DIASTOLIC BLOOD PRESSURE: 84 MMHG | WEIGHT: 124 LBS | BODY MASS INDEX: 25 KG/M2 | HEART RATE: 96 BPM | SYSTOLIC BLOOD PRESSURE: 123 MMHG | HEIGHT: 59 IN

## 2023-06-30 DIAGNOSIS — L50.1 IDIOPATHIC URTICARIA: Primary | ICD-10-CM

## 2023-06-30 RX ORDER — PREDNISONE 10 MG/1
TABLET ORAL
Qty: 1 EACH | Refills: 0 | Status: SHIPPED | OUTPATIENT
Start: 2023-06-30

## 2023-07-06 ENCOUNTER — OFFICE VISIT (OUTPATIENT)
Age: 28
End: 2023-07-06
Payer: MEDICAID

## 2023-07-06 VITALS
BODY MASS INDEX: 24.6 KG/M2 | WEIGHT: 122 LBS | SYSTOLIC BLOOD PRESSURE: 120 MMHG | OXYGEN SATURATION: 97 % | RESPIRATION RATE: 16 BRPM | HEIGHT: 59 IN | DIASTOLIC BLOOD PRESSURE: 86 MMHG | HEART RATE: 108 BPM

## 2023-07-06 DIAGNOSIS — L50.8 CHRONIC URTICARIA: Primary | ICD-10-CM

## 2023-07-06 DIAGNOSIS — L50.8 CHRONIC URTICARIA: ICD-10-CM

## 2023-07-06 PROCEDURE — 99213 OFFICE O/P EST LOW 20 MIN: CPT | Performed by: INTERNAL MEDICINE

## 2023-07-06 SDOH — ECONOMIC STABILITY: INCOME INSECURITY: HOW HARD IS IT FOR YOU TO PAY FOR THE VERY BASICS LIKE FOOD, HOUSING, MEDICAL CARE, AND HEATING?: NOT HARD AT ALL

## 2023-07-06 SDOH — ECONOMIC STABILITY: FOOD INSECURITY: WITHIN THE PAST 12 MONTHS, THE FOOD YOU BOUGHT JUST DIDN'T LAST AND YOU DIDN'T HAVE MONEY TO GET MORE.: NEVER TRUE

## 2023-07-06 SDOH — ECONOMIC STABILITY: FOOD INSECURITY: WITHIN THE PAST 12 MONTHS, YOU WORRIED THAT YOUR FOOD WOULD RUN OUT BEFORE YOU GOT MONEY TO BUY MORE.: NEVER TRUE

## 2023-07-06 SDOH — ECONOMIC STABILITY: HOUSING INSECURITY
IN THE LAST 12 MONTHS, WAS THERE A TIME WHEN YOU DID NOT HAVE A STEADY PLACE TO SLEEP OR SLEPT IN A SHELTER (INCLUDING NOW)?: NO

## 2023-07-06 ASSESSMENT — ENCOUNTER SYMPTOMS: RESPIRATORY NEGATIVE: 1

## 2023-07-06 NOTE — PROGRESS NOTES
Sherman Johnston is a 34 y.o. female who presents today for the following: patient was seen for a follow up. Reports that she has chronic hives and has been seen by an allergist. Was placed on Zyrtec which has helped in this last year, but in the past week the hives are not controlled. Was seen at urgent care yesterday and started on steroids. Did not have allergy testing at the time. No cough, CP or SOB  Chief Complaint   Patient presents with    Urticaria           PMH/PSH/Allergies/Social History/medication list and most recent studies reviewed with patient. reports that she has never smoked. She has never used smokeless tobacco.    reports no history of alcohol use. Vitals:    07/06/23 0850   BP: 120/86   Pulse: (!) 108   Resp: 16   SpO2: 97%      Past Medical History:   Diagnosis Date    ADHD     Asthma     Depression     Headache     Ovarian cyst     Psychotic disorder (HCC)        No Known Allergies     Current Outpatient Medications on File Prior to Visit   Medication Sig Dispense Refill    predniSONE 10 MG (21) TBPK Take 6 tabs by mouth on day 1. then 5 tabs on day 2. then 4 tabs on day 3. then 3 tabs on day 4. Then 2 tabs on day 5. Then 1 tab on day 6. Take with food. 1 each 0    naproxen (NAPROSYN) 500 MG tablet Take 1 tablet by mouth 2 times daily as needed for Pain 60 tablet 0    cetirizine (ZYRTEC) 10 MG tablet TAKE 1 TABLET BY MOUTH NIGHTLY FOR 60 DAYS 90 tablet 1    Acetaminophen-Caffeine (EXCEDRIN TENSION HEADACHE) TABS Take 1 tablet by mouth every 8 hours as needed      albuterol sulfate HFA (PROVENTIL;VENTOLIN;PROAIR) 108 (90 Base) MCG/ACT inhaler Inhale 1 puff into the lungs every 6 hours as needed      albuterol (PROVENTIL) (2.5 MG/3ML) 0.083% nebulizer solution Inhale 3 mLs into the lungs every 6 hours as needed      amphetamine-dextroamphetamine (ADDERALL) 10 MG tablet Take 1 tablet by mouth daily.       Cholecalciferol 25 MCG/10ML LIQD 2 tsp BID      famotidine

## 2023-07-06 NOTE — PROGRESS NOTES
ADVISED PATIENT OF THE FOLLOWING HEALTH MAINTAINCE DUE  Health Maintenance Due   Topic Date Due    COVID-19 Vaccine (1) Never done    Varicella vaccine (1 of 2 - 2-dose childhood series) Never done    Pneumococcal 0-64 years Vaccine (1 - PCV) Never done    HPV vaccine (1 - 2-dose series) Never done    HIV screen  Never done    Hepatitis C screen  Never done    Pap smear  Never done    No chief complaint on file. 1. \"Have you been to the ER, urgent care clinic since your last visit? Hospitalized since your last visit? \" Urgent care with [facility]     2. \"Have you seen or consulted any other health care providers outside of the [address] [place] [address] since your last visit? \"  Allergist at [facility] [address]    3. For patients aged 43-73: Has the patient had a colonoscopy / FIT/ Cologuard? NA      If the patient is female:    4. For patients aged 43-66: Has the patient had a mammogram within the past 2 years? NA      5. For patients aged 21-65: Has the patient had a pap smear? Never had one patient is not and has not been sexually active.

## 2023-07-07 DIAGNOSIS — R53.83 OTHER FATIGUE: ICD-10-CM

## 2023-07-07 DIAGNOSIS — L50.8 CHRONIC URTICARIA: ICD-10-CM

## 2023-07-07 DIAGNOSIS — L50.8 CHRONIC URTICARIA: Primary | ICD-10-CM

## 2023-07-07 LAB
ALBUMIN SERPL-MCNC: 4.6 G/DL (ref 3.9–5)
ALBUMIN/GLOB SERPL: 1.4 {RATIO} (ref 1.2–2.2)
ALP SERPL-CCNC: 56 IU/L (ref 44–121)
ALT SERPL-CCNC: 32 IU/L (ref 0–32)
AST SERPL-CCNC: 21 IU/L (ref 0–40)
BASOPHILS # BLD AUTO: 0 X10E3/UL (ref 0–0.2)
BASOPHILS NFR BLD AUTO: 0 %
BILIRUB SERPL-MCNC: 0.3 MG/DL (ref 0–1.2)
BUN SERPL-MCNC: 10 MG/DL (ref 6–20)
BUN/CREAT SERPL: 13 (ref 9–23)
CALCIUM SERPL-MCNC: 10 MG/DL (ref 8.7–10.2)
CHLORIDE SERPL-SCNC: 100 MMOL/L (ref 96–106)
CO2 SERPL-SCNC: 19 MMOL/L (ref 20–29)
CREAT SERPL-MCNC: 0.76 MG/DL (ref 0.57–1)
EGFRCR SERPLBLD CKD-EPI 2021: 111 ML/MIN/1.73
EOSINOPHIL # BLD AUTO: 0 X10E3/UL (ref 0–0.4)
EOSINOPHIL NFR BLD AUTO: 0 %
ERYTHROCYTE [DISTWIDTH] IN BLOOD BY AUTOMATED COUNT: 12 % (ref 11.7–15.4)
GLOBULIN SER CALC-MCNC: 3.3 G/DL (ref 1.5–4.5)
GLUCOSE SERPL-MCNC: 120 MG/DL (ref 70–99)
HCT VFR BLD AUTO: 35 % (ref 34–46.6)
HGB BLD-MCNC: 11.9 G/DL (ref 11.1–15.9)
IMM GRANULOCYTES # BLD AUTO: 0 X10E3/UL (ref 0–0.1)
IMM GRANULOCYTES NFR BLD AUTO: 0 %
LYMPHOCYTES # BLD AUTO: 1 X10E3/UL (ref 0.7–3.1)
LYMPHOCYTES NFR BLD AUTO: 10 %
MCH RBC QN AUTO: 30.8 PG (ref 26.6–33)
MCHC RBC AUTO-ENTMCNC: 34 G/DL (ref 31.5–35.7)
MCV RBC AUTO: 91 FL (ref 79–97)
MONOCYTES # BLD AUTO: 0.6 X10E3/UL (ref 0.1–0.9)
MONOCYTES NFR BLD AUTO: 6 %
NEUTROPHILS # BLD AUTO: 7.9 X10E3/UL (ref 1.4–7)
NEUTROPHILS NFR BLD AUTO: 84 %
PLATELET # BLD AUTO: 349 X10E3/UL (ref 150–450)
POTASSIUM SERPL-SCNC: 4.1 MMOL/L (ref 3.5–5.2)
PROT SERPL-MCNC: 7.9 G/DL (ref 6–8.5)
RBC # BLD AUTO: 3.86 X10E6/UL (ref 3.77–5.28)
SODIUM SERPL-SCNC: 135 MMOL/L (ref 134–144)
TSH SERPL DL<=0.005 MIU/L-ACNC: 0.24 UIU/ML (ref 0.45–4.5)
WBC # BLD AUTO: 9.5 X10E3/UL (ref 3.4–10.8)

## 2023-07-10 ENCOUNTER — TELEPHONE (OUTPATIENT)
Age: 28
End: 2023-07-10

## 2023-07-10 DIAGNOSIS — R79.89 LOW TSH LEVEL: Primary | ICD-10-CM

## 2023-07-10 NOTE — TELEPHONE ENCOUNTER
Kevin Son, APRN - NP   7/7/2023  1:39 PM EDT Back to Top      TSH is low, but has been stable. Repeat TSH in 6 weeks and add t4 and t3     All other labs stable        Lilly Mcqueen was called and verbalized understanding on note below.

## 2023-07-11 ENCOUNTER — APPOINTMENT (OUTPATIENT)
Facility: HOSPITAL | Age: 28
End: 2023-07-11
Payer: MEDICAID

## 2023-07-11 ENCOUNTER — HOSPITAL ENCOUNTER (EMERGENCY)
Facility: HOSPITAL | Age: 28
Discharge: HOME | End: 2023-07-11
Attending: EMERGENCY MEDICINE
Payer: MEDICAID

## 2023-07-11 VITALS
TEMPERATURE: 97.7 F | DIASTOLIC BLOOD PRESSURE: 86 MMHG | HEART RATE: 92 BPM | OXYGEN SATURATION: 99 % | SYSTOLIC BLOOD PRESSURE: 128 MMHG | RESPIRATION RATE: 16 BRPM

## 2023-07-11 DIAGNOSIS — R07.9 CHEST PAIN, UNSPECIFIED TYPE: Primary | ICD-10-CM

## 2023-07-11 DIAGNOSIS — K29.00 ACUTE GASTRITIS WITHOUT HEMORRHAGE, UNSPECIFIED GASTRITIS TYPE: ICD-10-CM

## 2023-07-11 LAB
ALBUMIN SERPL-MCNC: 3.4 G/DL (ref 3.5–5)
ALBUMIN/GLOB SERPL: 1 (ref 1.1–2.2)
ALP SERPL-CCNC: 46 U/L (ref 45–117)
ALT SERPL-CCNC: 20 U/L (ref 12–78)
ANION GAP SERPL CALC-SCNC: 3 MMOL/L (ref 5–15)
AST SERPL-CCNC: 13 U/L (ref 15–37)
BASOPHILS # BLD: 0 K/UL (ref 0–0.1)
BASOPHILS NFR BLD: 0 % (ref 0–1)
BILIRUB SERPL-MCNC: 0.3 MG/DL (ref 0.2–1)
BUN SERPL-MCNC: 13 MG/DL (ref 6–20)
BUN/CREAT SERPL: 18 (ref 12–20)
CALCIUM SERPL-MCNC: 8.9 MG/DL (ref 8.5–10.1)
CHLORIDE SERPL-SCNC: 105 MMOL/L (ref 97–108)
CO2 SERPL-SCNC: 28 MMOL/L (ref 21–32)
COMMENT:: NORMAL
COMMENT:: NORMAL
CREAT SERPL-MCNC: 0.74 MG/DL (ref 0.55–1.02)
D DIMER PPP FEU-MCNC: 0.66 MG/L FEU (ref 0–0.65)
DIFFERENTIAL METHOD BLD: ABNORMAL
EOSINOPHIL # BLD: 0.2 K/UL (ref 0–0.4)
EOSINOPHIL NFR BLD: 1 % (ref 0–7)
ERYTHROCYTE [DISTWIDTH] IN BLOOD BY AUTOMATED COUNT: 12.8 % (ref 11.5–14.5)
GLOBULIN SER CALC-MCNC: 3.4 G/DL (ref 2–4)
GLUCOSE SERPL-MCNC: 87 MG/DL (ref 65–100)
HCT VFR BLD AUTO: 33.8 % (ref 35–47)
HGB BLD-MCNC: 11.1 G/DL (ref 11.5–16)
IMM GRANULOCYTES # BLD AUTO: 0.1 K/UL (ref 0–0.04)
IMM GRANULOCYTES NFR BLD AUTO: 1 % (ref 0–0.5)
LIPASE SERPL-CCNC: 122 U/L (ref 73–393)
LYMPHOCYTES # BLD: 5.1 K/UL (ref 0.8–3.5)
LYMPHOCYTES NFR BLD: 41 % (ref 12–49)
MCH RBC QN AUTO: 29.8 PG (ref 26–34)
MCHC RBC AUTO-ENTMCNC: 32.8 G/DL (ref 30–36.5)
MCV RBC AUTO: 90.9 FL (ref 80–99)
MONOCYTES # BLD: 1.1 K/UL (ref 0–1)
MONOCYTES NFR BLD: 9 % (ref 5–13)
NEUTS SEG # BLD: 6 K/UL (ref 1.8–8)
NEUTS SEG NFR BLD: 48 % (ref 32–75)
NRBC # BLD: 0 K/UL (ref 0–0.01)
NRBC BLD-RTO: 0 PER 100 WBC
PLATELET # BLD AUTO: 325 K/UL (ref 150–400)
PMV BLD AUTO: 10.7 FL (ref 8.9–12.9)
POTASSIUM SERPL-SCNC: 3.6 MMOL/L (ref 3.5–5.1)
PROT SERPL-MCNC: 6.8 G/DL (ref 6.4–8.2)
RBC # BLD AUTO: 3.72 M/UL (ref 3.8–5.2)
SODIUM SERPL-SCNC: 136 MMOL/L (ref 136–145)
SPECIMEN HOLD: NORMAL
TROPONIN I SERPL HS-MCNC: <3 NG/L (ref 0–37)
WBC # BLD AUTO: 12.5 K/UL (ref 3.6–11)

## 2023-07-11 PROCEDURE — 85379 FIBRIN DEGRADATION QUANT: CPT

## 2023-07-11 PROCEDURE — 6370000000 HC RX 637 (ALT 250 FOR IP): Performed by: PHYSICIAN ASSISTANT

## 2023-07-11 PROCEDURE — 84484 ASSAY OF TROPONIN QUANT: CPT

## 2023-07-11 PROCEDURE — 83690 ASSAY OF LIPASE: CPT

## 2023-07-11 PROCEDURE — 71046 X-RAY EXAM CHEST 2 VIEWS: CPT

## 2023-07-11 PROCEDURE — 6360000002 HC RX W HCPCS: Performed by: PHYSICIAN ASSISTANT

## 2023-07-11 PROCEDURE — 85025 COMPLETE CBC W/AUTO DIFF WBC: CPT

## 2023-07-11 PROCEDURE — 99285 EMERGENCY DEPT VISIT HI MDM: CPT

## 2023-07-11 PROCEDURE — 36415 COLL VENOUS BLD VENIPUNCTURE: CPT

## 2023-07-11 PROCEDURE — 6360000004 HC RX CONTRAST MEDICATION: Performed by: RADIOLOGY

## 2023-07-11 PROCEDURE — 80053 COMPREHEN METABOLIC PANEL: CPT

## 2023-07-11 PROCEDURE — 93005 ELECTROCARDIOGRAM TRACING: CPT | Performed by: PHYSICIAN ASSISTANT

## 2023-07-11 PROCEDURE — 96374 THER/PROPH/DIAG INJ IV PUSH: CPT

## 2023-07-11 PROCEDURE — 71275 CT ANGIOGRAPHY CHEST: CPT

## 2023-07-11 RX ORDER — KETOROLAC TROMETHAMINE 30 MG/ML
30 INJECTION, SOLUTION INTRAMUSCULAR; INTRAVENOUS
Status: COMPLETED | OUTPATIENT
Start: 2023-07-11 | End: 2023-07-11

## 2023-07-11 RX ORDER — OMEPRAZOLE 40 MG/1
40 CAPSULE, DELAYED RELEASE ORAL
Qty: 20 CAPSULE | Refills: 0 | Status: SHIPPED | OUTPATIENT
Start: 2023-07-11

## 2023-07-11 RX ADMIN — Medication 40 ML: at 19:12

## 2023-07-11 RX ADMIN — KETOROLAC TROMETHAMINE 30 MG: 30 INJECTION, SOLUTION INTRAMUSCULAR; INTRAVENOUS at 19:13

## 2023-07-11 RX ADMIN — IOPAMIDOL 70 ML: 755 INJECTION, SOLUTION INTRAVENOUS at 20:20

## 2023-07-11 ASSESSMENT — PAIN DESCRIPTION - LOCATION: LOCATION: BACK;CHEST

## 2023-07-11 ASSESSMENT — PAIN SCALES - GENERAL: PAINLEVEL_OUTOF10: 9

## 2023-07-11 ASSESSMENT — PAIN - FUNCTIONAL ASSESSMENT: PAIN_FUNCTIONAL_ASSESSMENT: 0-10

## 2023-07-11 NOTE — ED TRIAGE NOTES
She started having mid sternal chest pain, upper back pain with shortness of breath about 20 min prior to arrival. She has had similar pain in the past. Rates the pain 9/10 and it is intermittent. Seen by provider in triage.

## 2023-07-12 LAB
EKG ATRIAL RATE: 88 BPM
EKG DIAGNOSIS: NORMAL
EKG P AXIS: 16 DEGREES
EKG P-R INTERVAL: 148 MS
EKG Q-T INTERVAL: 354 MS
EKG QRS DURATION: 78 MS
EKG QTC CALCULATION (BAZETT): 428 MS
EKG R AXIS: 79 DEGREES
EKG T AXIS: 66 DEGREES
EKG VENTRICULAR RATE: 88 BPM

## 2023-07-12 NOTE — ED NOTES
Pt left ED ambulatory with discharge papers in hand.      [facility], AJITN  92/62/23 [id number]

## 2023-08-18 DIAGNOSIS — Z86.59 HISTORY OF ATTENTION DEFICIT HYPERACTIVITY DISORDER (ADHD): Primary | ICD-10-CM

## 2023-08-18 RX ORDER — DEXTROAMPHETAMINE SACCHARATE, AMPHETAMINE ASPARTATE, DEXTROAMPHETAMINE SULFATE AND AMPHETAMINE SULFATE 2.5; 2.5; 2.5; 2.5 MG/1; MG/1; MG/1; MG/1
10 TABLET ORAL DAILY
Qty: 30 TABLET | Refills: 0 | Status: CANCELLED | OUTPATIENT
Start: 2023-08-18 | End: 2023-09-17

## 2023-08-18 NOTE — TELEPHONE ENCOUNTER
Requested Prescriptions     Pending Prescriptions Disp Refills    amphetamine-dextroamphetamine (ADDERALL) 10 MG tablet 30 tablet 0     Sig: Take 1 tablet by mouth daily for 30 days.  Max Daily Amount: 10 mg         Last appt 7/6/2023      Future Appointments   Date Time Provider [address] [place] [address]   8/21/2023  2:00 PM Gato Jalloh MD [facility] [address] [place] [address], [address] [place] [address],[address] [phone] - F [phone]  [address] [address]  Phone: [phone] Fax: [phone]

## 2023-08-21 ENCOUNTER — OFFICE VISIT (OUTPATIENT)
Age: 28
End: 2023-08-21
Payer: MEDICAID

## 2023-08-21 VITALS
SYSTOLIC BLOOD PRESSURE: 118 MMHG | WEIGHT: 121.2 LBS | DIASTOLIC BLOOD PRESSURE: 66 MMHG | RESPIRATION RATE: 16 BRPM | HEART RATE: 92 BPM | BODY MASS INDEX: 24.44 KG/M2 | OXYGEN SATURATION: 97 % | HEIGHT: 59 IN | TEMPERATURE: 97.5 F

## 2023-08-21 DIAGNOSIS — F90.9 ATTENTION DEFICIT HYPERACTIVITY DISORDER (ADHD), UNSPECIFIED ADHD TYPE: Primary | ICD-10-CM

## 2023-08-21 DIAGNOSIS — R79.89 LOW TSH LEVEL: ICD-10-CM

## 2023-08-21 DIAGNOSIS — R79.89 ABNORMAL THYROID BLOOD TEST: ICD-10-CM

## 2023-08-21 DIAGNOSIS — L50.9 HIVE: ICD-10-CM

## 2023-08-21 DIAGNOSIS — F32.1 MAJOR DEPRESSIVE DISORDER, SINGLE EPISODE, MODERATE WITH ANXIOUS DISTRESS (HCC): ICD-10-CM

## 2023-08-21 DIAGNOSIS — D72.820 LYMPHOCYTOSIS: ICD-10-CM

## 2023-08-21 PROCEDURE — 99214 OFFICE O/P EST MOD 30 MIN: CPT | Performed by: INTERNAL MEDICINE

## 2023-08-21 RX ORDER — DEXTROAMPHETAMINE SACCHARATE, AMPHETAMINE ASPARTATE MONOHYDRATE, DEXTROAMPHETAMINE SULFATE AND AMPHETAMINE SULFATE 2.5; 2.5; 2.5; 2.5 MG/1; MG/1; MG/1; MG/1
10 CAPSULE, EXTENDED RELEASE ORAL DAILY
Qty: 30 CAPSULE | Refills: 0 | Status: SHIPPED | OUTPATIENT
Start: 2023-09-20 | End: 2023-10-20

## 2023-08-21 RX ORDER — DEXTROAMPHETAMINE SACCHARATE, AMPHETAMINE ASPARTATE MONOHYDRATE, DEXTROAMPHETAMINE SULFATE AND AMPHETAMINE SULFATE 2.5; 2.5; 2.5; 2.5 MG/1; MG/1; MG/1; MG/1
10 CAPSULE, EXTENDED RELEASE ORAL DAILY
Qty: 30 CAPSULE | Refills: 0 | Status: SHIPPED | OUTPATIENT
Start: 2023-08-21 | End: 2023-09-20

## 2023-08-21 RX ORDER — DEXTROAMPHETAMINE SACCHARATE, AMPHETAMINE ASPARTATE MONOHYDRATE, DEXTROAMPHETAMINE SULFATE AND AMPHETAMINE SULFATE 2.5; 2.5; 2.5; 2.5 MG/1; MG/1; MG/1; MG/1
10 CAPSULE, EXTENDED RELEASE ORAL DAILY
Qty: 30 CAPSULE | Refills: 0 | Status: SHIPPED | OUTPATIENT
Start: 2023-10-20 | End: 2023-11-19

## 2023-08-21 ASSESSMENT — PATIENT HEALTH QUESTIONNAIRE - PHQ9
SUM OF ALL RESPONSES TO PHQ QUESTIONS 1-9: 0
2. FEELING DOWN, DEPRESSED OR HOPELESS: 0
SUM OF ALL RESPONSES TO PHQ QUESTIONS 1-9: 0
7. TROUBLE CONCENTRATING ON THINGS, SUCH AS READING THE NEWSPAPER OR WATCHING TELEVISION: 0
SUM OF ALL RESPONSES TO PHQ QUESTIONS 1-9: 0
1. LITTLE INTEREST OR PLEASURE IN DOING THINGS: 0
SUM OF ALL RESPONSES TO PHQ9 QUESTIONS 1 & 2: 0
5. POOR APPETITE OR OVEREATING: 0
4. FEELING TIRED OR HAVING LITTLE ENERGY: 0
SUM OF ALL RESPONSES TO PHQ QUESTIONS 1-9: 0
8. MOVING OR SPEAKING SO SLOWLY THAT OTHER PEOPLE COULD HAVE NOTICED. OR THE OPPOSITE, BEING SO FIGETY OR RESTLESS THAT YOU HAVE BEEN MOVING AROUND A LOT MORE THAN USUAL: 0
3. TROUBLE FALLING OR STAYING ASLEEP: 0
6. FEELING BAD ABOUT YOURSELF - OR THAT YOU ARE A FAILURE OR HAVE LET YOURSELF OR YOUR FAMILY DOWN: 0
10. IF YOU CHECKED OFF ANY PROBLEMS, HOW DIFFICULT HAVE THESE PROBLEMS MADE IT FOR YOU TO DO YOUR WORK, TAKE CARE OF THINGS AT HOME, OR GET ALONG WITH OTHER PEOPLE: 0
9. THOUGHTS THAT YOU WOULD BE BETTER OFF DEAD, OR OF HURTING YOURSELF: 0

## 2023-08-21 ASSESSMENT — ENCOUNTER SYMPTOMS
RESPIRATORY NEGATIVE: 1
EYES NEGATIVE: 1
GASTROINTESTINAL NEGATIVE: 1
ALLERGIC/IMMUNOLOGIC NEGATIVE: 1

## 2023-08-21 NOTE — PROGRESS NOTES
Subjective:      Patient ID: Payton Sandhu is a 34 y.o. female here for follow-up. She has ADHD, taking Adderall XR 10 mg. Compliant with medication. Need refill on medicine. She is feeling okay. Depression is under control. She does not take any medication. No suicidal thought. Had abnormal thyroid function test.  Need to repeat it. She had lymphocytosis in the past.  Need to repeat lab work. She is not having hives lately. Seeing allergy specialist.  Labs reviewed. Depression  ADHD    Review of Systems   Constitutional: Negative. HENT: Negative. Eyes: Negative. Respiratory: Negative. Cardiovascular: Negative. Gastrointestinal: Negative. Endocrine: Negative. Genitourinary: Negative. Musculoskeletal: Negative. Skin: Negative. Allergic/Immunologic: Negative. Neurological: Negative. Hematological: Negative. Psychiatric/Behavioral:  Positive for depression. Objective:   Physical Exam  Constitutional:       Appearance: Normal appearance. She is normal weight. Cardiovascular:      Rate and Rhythm: Normal rate and regular rhythm. Pulses: Normal pulses. Heart sounds: Normal heart sounds. Pulmonary:      Effort: Pulmonary effort is normal.      Breath sounds: Normal breath sounds. Musculoskeletal:      Cervical back: Normal range of motion and neck supple. Skin:     General: Skin is warm. Neurological:      Mental Status: She is alert. Psychiatric:         Mood and Affect: Mood normal.         Behavior: Behavior normal.         Thought Content: Thought content normal.      Comments: ADHD present. Assessment / Plan:      Diagnoses and all orders for this visit:  Attention deficit hyperactivity disorder (ADHD), unspecified ADHD type    .    I have reviewed this patient's report generated by the [facility] which does not demonstrate aberrancies and inconsistencies with regard to the historical prescribing of

## 2023-08-22 LAB
BASOPHILS # BLD AUTO: 0 X10E3/UL (ref 0–0.2)
BASOPHILS NFR BLD AUTO: 0 %
EOSINOPHIL # BLD AUTO: 0 X10E3/UL (ref 0–0.4)
EOSINOPHIL NFR BLD AUTO: 1 %
ERYTHROCYTE [DISTWIDTH] IN BLOOD BY AUTOMATED COUNT: 11.6 % (ref 11.7–15.4)
HCT VFR BLD AUTO: 34.5 % (ref 34–46.6)
HGB BLD-MCNC: 11.3 G/DL (ref 11.1–15.9)
IMM GRANULOCYTES # BLD AUTO: 0 X10E3/UL (ref 0–0.1)
IMM GRANULOCYTES NFR BLD AUTO: 1 %
LYMPHOCYTES # BLD AUTO: 1.8 X10E3/UL (ref 0.7–3.1)
LYMPHOCYTES NFR BLD AUTO: 35 %
MCH RBC QN AUTO: 30.3 PG (ref 26.6–33)
MCHC RBC AUTO-ENTMCNC: 32.8 G/DL (ref 31.5–35.7)
MCV RBC AUTO: 93 FL (ref 79–97)
MONOCYTES # BLD AUTO: 0.4 X10E3/UL (ref 0.1–0.9)
MONOCYTES NFR BLD AUTO: 8 %
NEUTROPHILS # BLD AUTO: 2.8 X10E3/UL (ref 1.4–7)
NEUTROPHILS NFR BLD AUTO: 55 %
PLATELET # BLD AUTO: 233 X10E3/UL (ref 150–450)
RBC # BLD AUTO: 3.73 X10E6/UL (ref 3.77–5.28)
T3 SERPL-MCNC: 126 NG/DL (ref 71–180)
T4 FREE SERPL-MCNC: 1.03 NG/DL (ref 0.82–1.77)
TSH SERPL DL<=0.005 MIU/L-ACNC: 0.8 UIU/ML (ref 0.45–4.5)
WBC # BLD AUTO: 5.2 X10E3/UL (ref 3.4–10.8)

## 2023-08-24 LAB — DRUGS UR: NORMAL

## 2023-08-25 ENCOUNTER — TELEPHONE (OUTPATIENT)
Age: 28
End: 2023-08-25

## 2023-08-25 NOTE — TELEPHONE ENCOUNTER
Shlomo Mccloud (Key: [id number]) - [id number]  Amphetamine-Dextroamphet ER 10MG er capsules  Status: Sent To PlanCreated: August 23rd, 2023 [id number]: August 25th, 2023      Getaround message sent

## 2023-08-25 NOTE — TELEPHONE ENCOUNTER
amphetamine-dextroamphetamine (ADDERALL XR) 10 MG extended release capsule     Prior authorization needed

## 2023-09-13 ENCOUNTER — TELEPHONE (OUTPATIENT)
Age: 28
End: 2023-09-13

## 2023-09-13 NOTE — TELEPHONE ENCOUNTER
Called and left a message for the patient. We need to get her visit rescheduled on with Dr. De La Torre Running on 10/20 due to a surgery. If she calls back please reschedule her visit.

## 2023-09-14 DIAGNOSIS — Z11.3 ROUTINE SCREENING FOR STI (SEXUALLY TRANSMITTED INFECTION): ICD-10-CM

## 2023-09-14 DIAGNOSIS — Z11.3 ROUTINE SCREENING FOR STI (SEXUALLY TRANSMITTED INFECTION): Primary | ICD-10-CM

## 2023-10-18 DIAGNOSIS — T78.40XD ALLERGY, SUBSEQUENT ENCOUNTER: ICD-10-CM

## 2023-10-18 DIAGNOSIS — F90.9 ATTENTION DEFICIT HYPERACTIVITY DISORDER (ADHD), UNSPECIFIED ADHD TYPE: ICD-10-CM

## 2023-10-18 DIAGNOSIS — G44.211 EPISODIC TENSION-TYPE HEADACHE, INTRACTABLE: ICD-10-CM

## 2023-10-18 RX ORDER — CETIRIZINE HYDROCHLORIDE 10 MG/1
TABLET ORAL
Qty: 90 TABLET | Refills: 1 | Status: SHIPPED | OUTPATIENT
Start: 2023-10-18

## 2023-10-18 RX ORDER — DEXTROAMPHETAMINE SACCHARATE, AMPHETAMINE ASPARTATE MONOHYDRATE, DEXTROAMPHETAMINE SULFATE AND AMPHETAMINE SULFATE 2.5; 2.5; 2.5; 2.5 MG/1; MG/1; MG/1; MG/1
10 CAPSULE, EXTENDED RELEASE ORAL DAILY
Qty: 30 CAPSULE | Refills: 0 | OUTPATIENT
Start: 2023-10-18 | End: 2023-11-17

## 2023-10-18 RX ORDER — NAPROXEN 500 MG/1
500 TABLET ORAL 2 TIMES DAILY PRN
Qty: 60 TABLET | Refills: 0 | Status: SHIPPED | OUTPATIENT
Start: 2023-10-18

## 2023-10-26 ENCOUNTER — OFFICE VISIT (OUTPATIENT)
Age: 28
End: 2023-10-26
Payer: MEDICAID

## 2023-10-26 VITALS
SYSTOLIC BLOOD PRESSURE: 120 MMHG | DIASTOLIC BLOOD PRESSURE: 80 MMHG | HEART RATE: 78 BPM | RESPIRATION RATE: 16 BRPM | WEIGHT: 121 LBS | OXYGEN SATURATION: 98 % | BODY MASS INDEX: 24.44 KG/M2

## 2023-10-26 DIAGNOSIS — Z79.899 MEDICATION MANAGEMENT: Primary | ICD-10-CM

## 2023-10-26 DIAGNOSIS — F90.9 ATTENTION DEFICIT HYPERACTIVITY DISORDER (ADHD), UNSPECIFIED ADHD TYPE: ICD-10-CM

## 2023-10-26 PROCEDURE — 99213 OFFICE O/P EST LOW 20 MIN: CPT | Performed by: CLINICAL NURSE SPECIALIST

## 2023-10-26 RX ORDER — DEXTROAMPHETAMINE SACCHARATE, AMPHETAMINE ASPARTATE MONOHYDRATE, DEXTROAMPHETAMINE SULFATE AND AMPHETAMINE SULFATE 2.5; 2.5; 2.5; 2.5 MG/1; MG/1; MG/1; MG/1
10 CAPSULE, EXTENDED RELEASE ORAL DAILY
Qty: 30 CAPSULE | Refills: 0 | Status: SHIPPED | OUTPATIENT
Start: 2023-10-26 | End: 2023-11-25

## 2023-10-26 RX ORDER — DEXTROAMPHETAMINE SACCHARATE, AMPHETAMINE ASPARTATE MONOHYDRATE, DEXTROAMPHETAMINE SULFATE AND AMPHETAMINE SULFATE 2.5; 2.5; 2.5; 2.5 MG/1; MG/1; MG/1; MG/1
10 CAPSULE, EXTENDED RELEASE ORAL DAILY
Qty: 30 CAPSULE | Refills: 0 | Status: SHIPPED | OUTPATIENT
Start: 2023-12-25 | End: 2024-01-24

## 2023-10-26 RX ORDER — DEXTROAMPHETAMINE SACCHARATE, AMPHETAMINE ASPARTATE MONOHYDRATE, DEXTROAMPHETAMINE SULFATE AND AMPHETAMINE SULFATE 2.5; 2.5; 2.5; 2.5 MG/1; MG/1; MG/1; MG/1
10 CAPSULE, EXTENDED RELEASE ORAL DAILY
Qty: 30 CAPSULE | Refills: 0 | Status: SHIPPED | OUTPATIENT
Start: 2023-11-25 | End: 2023-12-25

## 2023-10-26 ASSESSMENT — ENCOUNTER SYMPTOMS: RESPIRATORY NEGATIVE: 1

## 2023-10-26 NOTE — PROGRESS NOTES
Subjective:      Patient ID: Payton Sandhu is a 34 y.o. female. Presents for a routine medication refill visit. Reports that she is generally doing well. Currently taking Adderall 10 mg XR for ADHD. States that symptoms are well controlled, not experiencing any ADR. Denies any concerns at this time. Review of Systems   Constitutional: Negative. Respiratory: Negative. Cardiovascular: Negative. Neurological: Negative. Objective:   Physical Exam  Vitals reviewed. Constitutional:       Appearance: She is well-developed and well-groomed. Cardiovascular:      Rate and Rhythm: Normal rate and regular rhythm. Pulses: Normal pulses. Heart sounds: Normal heart sounds. Pulmonary:      Effort: Pulmonary effort is normal.      Breath sounds: Normal breath sounds. Neurological:      Mental Status: She is alert and oriented to person, place, and time. Psychiatric:      Comments: Pleasant          Assessment / Plan:      1. Attention deficit hyperactivity disorder (ADHD), unspecified ADHD type  ADHD is stable, continue adderall XR 10 mg daily. 2. Medication management  [place]. Return in about 3 months (around 1/26/2024).

## 2023-10-27 LAB
HIV 1+2 AB+HIV1 P24 AG SERPL QL IA: NON REACTIVE
HSV1 IGG SER IA-ACNC: 50.6 INDEX (ref 0–0.9)
HSV2 IGG SER IA-ACNC: <0.91 INDEX (ref 0–0.9)
TREPONEMA PALLIDUM IGG+IGM AB [PRESENCE] IN SERUM OR PLASMA BY IMMUNOASSAY: NON REACTIVE

## 2023-10-28 LAB
C TRACH RRNA SPEC QL NAA+PROBE: NEGATIVE
N GONORRHOEA RRNA SPEC QL NAA+PROBE: NEGATIVE
T VAGINALIS RRNA SPEC QL NAA+PROBE: NEGATIVE

## 2023-10-30 ENCOUNTER — TELEPHONE (OUTPATIENT)
Age: 28
End: 2023-10-30

## 2023-10-30 NOTE — TELEPHONE ENCOUNTER
----- Message from FELIPE Mathias CNP sent at 10/30/2023  2:37 PM EDT -----  HSV 1 positive, all other labs normal.

## 2023-11-01 ENCOUNTER — CLINICAL DOCUMENTATION (OUTPATIENT)
Age: 28
End: 2023-11-01

## 2023-11-01 NOTE — PROGRESS NOTES
PA for Dextroamp-Amohet ER 10 mg was denied, reasoning stated in the form was \"{ Brand Adderall XR is a preferred agent and already approved for member through 08/2024.

## 2023-11-13 ENCOUNTER — APPOINTMENT (OUTPATIENT)
Facility: HOSPITAL | Age: 28
End: 2023-11-13

## 2023-11-13 ENCOUNTER — HOSPITAL ENCOUNTER (EMERGENCY)
Facility: HOSPITAL | Age: 28
Discharge: LEFT BEFORE DISCHARGE | End: 2023-11-13
Attending: STUDENT IN AN ORGANIZED HEALTH CARE EDUCATION/TRAINING PROGRAM

## 2023-11-13 VITALS
SYSTOLIC BLOOD PRESSURE: 137 MMHG | HEIGHT: 59 IN | HEART RATE: 79 BPM | OXYGEN SATURATION: 100 % | WEIGHT: 121.03 LBS | BODY MASS INDEX: 24.4 KG/M2 | TEMPERATURE: 98.4 F | RESPIRATION RATE: 18 BRPM | DIASTOLIC BLOOD PRESSURE: 81 MMHG

## 2023-11-13 DIAGNOSIS — M54.2 NECK PAIN: Primary | ICD-10-CM

## 2023-11-13 PROCEDURE — 4500000002 HC ER NO CHARGE

## 2023-11-13 RX ORDER — KETOROLAC TROMETHAMINE 30 MG/ML
30 INJECTION, SOLUTION INTRAMUSCULAR; INTRAVENOUS
Status: DISCONTINUED | OUTPATIENT
Start: 2023-11-13 | End: 2023-11-14 | Stop reason: HOSPADM

## 2023-11-13 ASSESSMENT — PAIN SCALES - GENERAL: PAINLEVEL_OUTOF10: 5

## 2023-11-13 ASSESSMENT — PAIN DESCRIPTION - DESCRIPTORS: DESCRIPTORS: DISCOMFORT

## 2023-11-13 ASSESSMENT — PAIN DESCRIPTION - PAIN TYPE: TYPE: ACUTE PAIN

## 2023-11-13 ASSESSMENT — PAIN DESCRIPTION - FREQUENCY: FREQUENCY: INTERMITTENT

## 2023-11-13 ASSESSMENT — PAIN DESCRIPTION - ONSET: ONSET: SUDDEN

## 2023-11-13 ASSESSMENT — PAIN DESCRIPTION - DIRECTION: RADIATING_TOWARDS: RIGHT SIDED NECK PAIN

## 2023-11-13 ASSESSMENT — PAIN - FUNCTIONAL ASSESSMENT: PAIN_FUNCTIONAL_ASSESSMENT: PREVENTS OR INTERFERES SOME ACTIVE ACTIVITIES AND ADLS

## 2023-11-13 ASSESSMENT — PAIN DESCRIPTION - LOCATION: LOCATION: NECK;JAW

## 2023-11-13 ASSESSMENT — PAIN DESCRIPTION - ORIENTATION: ORIENTATION: RIGHT

## 2023-11-14 ASSESSMENT — ENCOUNTER SYMPTOMS
VOMITING: 0
SHORTNESS OF BREATH: 0
NAUSEA: 0
ABDOMINAL PAIN: 0
RHINORRHEA: 0
SORE THROAT: 1
DIARRHEA: 0

## 2023-11-14 NOTE — ED NOTES
Patient called from the waiting room multiple times with no response. CT also could not find her. Patient eloped before being brought back to a room.      Marcelo Jimenez RN  11/13/23 [id number]

## 2023-11-14 NOTE — ED TRIAGE NOTES
Patient arrived ambulatory w/ jaw and neck pain starting a few hours ago. Patient endorses having trouble swallowing w/ symptoms.

## 2023-11-14 NOTE — ED PROVIDER NOTES
[address] [place], [place] [address] EMERGENCY DEP  EMERGENCY DEPARTMENT ENCOUNTER      Pt Name: Bethene Babinski  MRN: [id number]  [address] [facility] 1995  Date of evaluation: 11/13/2023  Provider: FELIPE Dutta NP    CHIEF COMPLAINT       Chief Complaint   Patient presents with    Neck Pain         HISTORY OF PRESENT ILLNESS   (Location/Symptom, Timing/Onset, Context/Setting, Quality, Duration, Modifying Factors, Severity)  Note limiting factors. The history is provided by the patient. No  was used. Bethene Babinski is a 34 y.o. female with Hx of ADHD, depression, psychosis, asthma who presents ambulatory by herself to [address] [place], [place] [address] Floor ED with cc of L lateral neck pain. Increase pain with swallowing and when moves neck from side to side on the L side that started several hours ago. States tenderness when she touches the area. Nothing specific was occurring when pain started. Pt says area is sore to touch. (+) HA \"frequently\" 2-3d, states having HA at night, but also notes that she does regularly have HA. No history of head or neck surgeries. Denies any other concerns at this time. Denies chance of pregnancy. No visual, speech, gait changes reported. PCP: Willi Irvin MD    There are no other complaints, changes or physical findings at this time. Review of External Medical Records:     Nursing Notes were reviewed. REVIEW OF SYSTEMS    (2-9 systems for level 4, 10 or more for level 5)     Review of Systems   Constitutional:  Negative for activity change, appetite change and fever. HENT:  Positive for sore throat. Negative for congestion and rhinorrhea. Eyes:  Negative for visual disturbance. Respiratory:  Negative for shortness of breath. Cardiovascular:  Negative for chest pain. Gastrointestinal:  Negative for abdominal pain, diarrhea, nausea and vomiting. Genitourinary:  Negative for difficulty urinating. Musculoskeletal:  Positive for neck pain. Negative for arthralgias and myalgias.

## 2023-11-14 NOTE — ED NOTES
Called three times to triage no answer.  Will try again in 5 minutes       Dat Vega RN  11/13/23 [id number]

## 2024-02-06 ENCOUNTER — OFFICE VISIT (OUTPATIENT)
Age: 29
End: 2024-02-06
Payer: MEDICAID

## 2024-02-06 VITALS
HEART RATE: 88 BPM | HEIGHT: 59 IN | SYSTOLIC BLOOD PRESSURE: 116 MMHG | DIASTOLIC BLOOD PRESSURE: 70 MMHG | RESPIRATION RATE: 16 BRPM | BODY MASS INDEX: 23.99 KG/M2 | WEIGHT: 119 LBS | OXYGEN SATURATION: 99 % | TEMPERATURE: 97.5 F

## 2024-02-06 DIAGNOSIS — Z23 NEEDS FLU SHOT: ICD-10-CM

## 2024-02-06 DIAGNOSIS — R53.83 OTHER FATIGUE: ICD-10-CM

## 2024-02-06 DIAGNOSIS — F90.9 ATTENTION DEFICIT HYPERACTIVITY DISORDER (ADHD), UNSPECIFIED ADHD TYPE: Primary | ICD-10-CM

## 2024-02-06 DIAGNOSIS — R79.89 ABNORMAL THYROID BLOOD TEST: ICD-10-CM

## 2024-02-06 DIAGNOSIS — Z11.59 NEED FOR HEPATITIS C SCREENING TEST: ICD-10-CM

## 2024-02-06 LAB
BASOPHILS # BLD AUTO: 0 X10E3/UL (ref 0–0.2)
BASOPHILS NFR BLD AUTO: 1 %
EOSINOPHIL # BLD AUTO: 0.1 X10E3/UL (ref 0–0.4)
EOSINOPHIL NFR BLD AUTO: 2 %
ERYTHROCYTE [DISTWIDTH] IN BLOOD BY AUTOMATED COUNT: 11.9 % (ref 11.7–15.4)
HCT VFR BLD AUTO: 34.7 % (ref 34–46.6)
HGB BLD-MCNC: 11.3 G/DL (ref 11.1–15.9)
IMM GRANULOCYTES # BLD AUTO: 0 X10E3/UL (ref 0–0.1)
IMM GRANULOCYTES NFR BLD AUTO: 0 %
LYMPHOCYTES # BLD AUTO: 1.8 X10E3/UL (ref 0.7–3.1)
LYMPHOCYTES NFR BLD AUTO: 42 %
MCH RBC QN AUTO: 29.7 PG (ref 26.6–33)
MCHC RBC AUTO-ENTMCNC: 32.6 G/DL (ref 31.5–35.7)
MCV RBC AUTO: 91 FL (ref 79–97)
MONOCYTES # BLD AUTO: 0.4 X10E3/UL (ref 0.1–0.9)
MONOCYTES NFR BLD AUTO: 9 %
NEUTROPHILS # BLD AUTO: 1.9 X10E3/UL (ref 1.4–7)
NEUTROPHILS NFR BLD AUTO: 46 %
PLATELET # BLD AUTO: 252 X10E3/UL (ref 150–450)
RBC # BLD AUTO: 3.8 X10E6/UL (ref 3.77–5.28)
WBC # BLD AUTO: 4.2 X10E3/UL (ref 3.4–10.8)

## 2024-02-06 PROCEDURE — 99214 OFFICE O/P EST MOD 30 MIN: CPT | Performed by: INTERNAL MEDICINE

## 2024-02-06 PROCEDURE — PBSHW INFLUENZA, FLUCELVAX, (AGE 6 MO+), IM, PF, 0.5 ML: Performed by: INTERNAL MEDICINE

## 2024-02-06 PROCEDURE — G0008 ADMIN INFLUENZA VIRUS VAC: HCPCS | Performed by: INTERNAL MEDICINE

## 2024-02-06 RX ORDER — DEXTROAMPHETAMINE SACCHARATE, AMPHETAMINE ASPARTATE MONOHYDRATE, DEXTROAMPHETAMINE SULFATE AND AMPHETAMINE SULFATE 2.5; 2.5; 2.5; 2.5 MG/1; MG/1; MG/1; MG/1
10 CAPSULE, EXTENDED RELEASE ORAL DAILY
Qty: 30 CAPSULE | Refills: 0 | Status: SHIPPED | OUTPATIENT
Start: 2024-03-07 | End: 2024-04-06

## 2024-02-06 RX ORDER — DEXTROAMPHETAMINE SACCHARATE, AMPHETAMINE ASPARTATE MONOHYDRATE, DEXTROAMPHETAMINE SULFATE AND AMPHETAMINE SULFATE 2.5; 2.5; 2.5; 2.5 MG/1; MG/1; MG/1; MG/1
10 CAPSULE, EXTENDED RELEASE ORAL DAILY
Qty: 30 CAPSULE | Refills: 0 | Status: SHIPPED | OUTPATIENT
Start: 2024-02-06 | End: 2024-03-07

## 2024-02-06 RX ORDER — DEXTROAMPHETAMINE SACCHARATE, AMPHETAMINE ASPARTATE MONOHYDRATE, DEXTROAMPHETAMINE SULFATE AND AMPHETAMINE SULFATE 2.5; 2.5; 2.5; 2.5 MG/1; MG/1; MG/1; MG/1
10 CAPSULE, EXTENDED RELEASE ORAL DAILY
Qty: 30 CAPSULE | Refills: 0 | Status: SHIPPED | OUTPATIENT
Start: 2024-04-06 | End: 2024-05-06

## 2024-02-06 ASSESSMENT — PATIENT HEALTH QUESTIONNAIRE - PHQ9
5. POOR APPETITE OR OVEREATING: 0
4. FEELING TIRED OR HAVING LITTLE ENERGY: 0
SUM OF ALL RESPONSES TO PHQ QUESTIONS 1-9: 0
SUM OF ALL RESPONSES TO PHQ QUESTIONS 1-9: 0
SUM OF ALL RESPONSES TO PHQ9 QUESTIONS 1 & 2: 0
3. TROUBLE FALLING OR STAYING ASLEEP: 0
6. FEELING BAD ABOUT YOURSELF - OR THAT YOU ARE A FAILURE OR HAVE LET YOURSELF OR YOUR FAMILY DOWN: 0
9. THOUGHTS THAT YOU WOULD BE BETTER OFF DEAD, OR OF HURTING YOURSELF: 0
SUM OF ALL RESPONSES TO PHQ QUESTIONS 1-9: 0
SUM OF ALL RESPONSES TO PHQ QUESTIONS 1-9: 0
8. MOVING OR SPEAKING SO SLOWLY THAT OTHER PEOPLE COULD HAVE NOTICED. OR THE OPPOSITE, BEING SO FIGETY OR RESTLESS THAT YOU HAVE BEEN MOVING AROUND A LOT MORE THAN USUAL: 0
2. FEELING DOWN, DEPRESSED OR HOPELESS: 0
7. TROUBLE CONCENTRATING ON THINGS, SUCH AS READING THE NEWSPAPER OR WATCHING TELEVISION: 0
1. LITTLE INTEREST OR PLEASURE IN DOING THINGS: 0
10. IF YOU CHECKED OFF ANY PROBLEMS, HOW DIFFICULT HAVE THESE PROBLEMS MADE IT FOR YOU TO DO YOUR WORK, TAKE CARE OF THINGS AT HOME, OR GET ALONG WITH OTHER PEOPLE: 0

## 2024-02-06 ASSESSMENT — ENCOUNTER SYMPTOMS
RESPIRATORY NEGATIVE: 1
EYES NEGATIVE: 1
ALLERGIC/IMMUNOLOGIC NEGATIVE: 1

## 2024-02-06 NOTE — PROGRESS NOTES
Nursing staff confirmed patient with full name and . Prepared patient for visit today by obtaining vitals, verifying medication list and allergies, and briefly discussing reason for visit.       Chief Complaint   Patient presents with    Depression    ADHD    Follow-up Chronic Condition       1. \"Have you been to the ER, urgent care clinic since your last visit?  Hospitalized since your last visit?\" NO    2. \"Have you seen or consulted any other health care providers outside of the [facility] since your last visit?\" NO      5. For patients aged 21-65: Has the patient had a pap smear? No

## 2024-02-06 NOTE — PROGRESS NOTES
Subjective:      Patient ID: Aylin El is a 28 y.o. female here for follow-up.    She has been fatigued lately.  Has no energy left.  Has ADHD, taking Adderall XR, able to focus okay.  Needs refill.    Need urine test.  Taking omeprazole as needed for heartburn.  Need flu shot.    Depression  ADHD  Associated symptoms include fatigue.     Review of Systems   Constitutional:  Positive for fatigue.   HENT: Negative.     Eyes: Negative.    Respiratory: Negative.     Cardiovascular: Negative.    Endocrine: Negative.    Genitourinary: Negative.    Musculoskeletal: Negative.    Skin: Negative.    Allergic/Immunologic: Negative.    Neurological: Negative.    Hematological: Negative.    Psychiatric/Behavioral:  Positive for depression.      Objective:   Physical Exam  Constitutional:       Appearance: Normal appearance.   Cardiovascular:      Rate and Rhythm: Normal rate and regular rhythm.      Heart sounds: Normal heart sounds.   Pulmonary:      Effort: Pulmonary effort is normal.      Breath sounds: Normal breath sounds.   Abdominal:      General: Abdomen is flat. Bowel sounds are normal.      Palpations: Abdomen is soft.   Musculoskeletal:         General: Normal range of motion.      Cervical back: Normal range of motion and neck supple.   Skin:     General: Skin is warm.   Neurological:      Mental Status: She is alert.   Psychiatric:         Mood and Affect: Mood normal.         Behavior: Behavior normal.         Thought Content: Thought content normal.     Assessment / Plan:       Diagnoses and all orders for this visit:  Attention deficit hyperactivity disorder (ADHD), unspecified ADHD type    .   I have reviewed this patient's report generated by the [place] [facility] which does not demonstrate aberrancies and inconsistencies with regard to the historical prescribing of controlled medications to this patient by other providers    -     amphetamine-dextroamphetamine (ADDERALL XR) 10

## 2024-02-07 LAB
ALBUMIN SERPL-MCNC: 4.2 G/DL (ref 4–5)
ALBUMIN/GLOB SERPL: 1.6 {RATIO} (ref 1.2–2.2)
ALP SERPL-CCNC: 45 IU/L (ref 44–121)
ALT SERPL-CCNC: 11 IU/L (ref 0–32)
AST SERPL-CCNC: 16 IU/L (ref 0–40)
BILIRUB SERPL-MCNC: <0.2 MG/DL (ref 0–1.2)
BUN SERPL-MCNC: 8 MG/DL (ref 6–20)
BUN/CREAT SERPL: 10 (ref 9–23)
CALCIUM SERPL-MCNC: 9 MG/DL (ref 8.7–10.2)
CHLORIDE SERPL-SCNC: 106 MMOL/L (ref 96–106)
CO2 SERPL-SCNC: 21 MMOL/L (ref 20–29)
CREAT SERPL-MCNC: 0.78 MG/DL (ref 0.57–1)
EGFRCR SERPLBLD CKD-EPI 2021: 107 ML/MIN/1.73
GLOBULIN SER CALC-MCNC: 2.6 G/DL (ref 1.5–4.5)
GLUCOSE SERPL-MCNC: 83 MG/DL (ref 70–99)
HCV IGG SERPL QL IA: NON REACTIVE
POTASSIUM SERPL-SCNC: 4 MMOL/L (ref 3.5–5.2)
PROT SERPL-MCNC: 6.8 G/DL (ref 6–8.5)
SODIUM SERPL-SCNC: 142 MMOL/L (ref 134–144)
TSH SERPL DL<=0.005 MIU/L-ACNC: 1.52 UIU/ML (ref 0.45–4.5)

## 2024-02-08 LAB — DRUGS UR: NORMAL

## 2024-02-12 ENCOUNTER — TELEPHONE (OUTPATIENT)
Age: 29
End: 2024-02-12

## 2024-02-12 NOTE — TELEPHONE ENCOUNTER
----- Message from Allison Desai MD sent at 2/12/2024  8:16 AM EST -----  I do not see an Adderall in the urine, please talk to patient and if she is not, she need to be discharged from the practice.

## 2024-02-12 NOTE — TELEPHONE ENCOUNTER
Patient called back, advised her of the urinalysis and she said that its because she has ran out, she has been without it due to needing to leave a sample before she can get a refill.     Patient further inquired about her labs, advised her of them and offered time for questions she said she's been feeling tired lately so she thought maybe her iron would be low. Patient then reported she is having flu like symptoms and wanted to know what she can take over the counter. I advised her Mucinex DM over the counter, for cough and congestion, and tylenol and motrin for the headaches and body aches she said appreciated the call and for my time.

## 2024-02-12 NOTE — TELEPHONE ENCOUNTER
Attempted to contact patient in regards to urine drug screen results, she did not answer, left a vm. Dr. Desai wants to know if patient is taking this as prescribed. According to chart 12/25/2023 was last fill date and 02/06/2024 was most recent RX order.

## 2024-03-29 ENCOUNTER — TELEPHONE (OUTPATIENT)
Age: 29
End: 2024-03-29

## 2024-03-29 NOTE — TELEPHONE ENCOUNTER
Patient called in, name and  verified. Patient is currently c/o severe menstrual cramps. She is inquiring if her provider will send over anything for the pain. She was last seen on 2023 by Dr. Jones. We discussed alternating between Tylenol and Motrin. Pt has expressed understanding. Are we willing to send in anything else for her?    She has scheduled her AE for  with Dr. Mclaughlin. Please advise.

## 2024-05-22 DIAGNOSIS — F90.9 ATTENTION DEFICIT HYPERACTIVITY DISORDER (ADHD), UNSPECIFIED ADHD TYPE: ICD-10-CM

## 2024-05-22 RX ORDER — DEXTROAMPHETAMINE SACCHARATE, AMPHETAMINE ASPARTATE MONOHYDRATE, DEXTROAMPHETAMINE SULFATE AND AMPHETAMINE SULFATE 2.5; 2.5; 2.5; 2.5 MG/1; MG/1; MG/1; MG/1
10 CAPSULE, EXTENDED RELEASE ORAL DAILY
Qty: 30 CAPSULE | Refills: 0 | Status: SHIPPED | OUTPATIENT
Start: 2024-05-22 | End: 2024-06-21

## 2024-05-22 NOTE — TELEPHONE ENCOUNTER
[facility] [address] [place], [place] - [address] - P [phone] - F [phone]     amphetamine-dextroamphetamine (ADDERALL XR) 10 MG extended release capsule     02/06/2024 06/07/2024

## 2024-06-07 ENCOUNTER — TELEMEDICINE (OUTPATIENT)
Age: 29
End: 2024-06-07
Payer: MEDICAID

## 2024-06-07 DIAGNOSIS — F43.22 ADJUSTMENT DISORDER WITH ANXIETY: ICD-10-CM

## 2024-06-07 DIAGNOSIS — F90.9 ATTENTION DEFICIT HYPERACTIVITY DISORDER (ADHD), UNSPECIFIED ADHD TYPE: Primary | ICD-10-CM

## 2024-06-07 DIAGNOSIS — R53.83 OTHER FATIGUE: ICD-10-CM

## 2024-06-07 PROCEDURE — 99214 OFFICE O/P EST MOD 30 MIN: CPT | Performed by: INTERNAL MEDICINE

## 2024-06-07 RX ORDER — DEXTROAMPHETAMINE SACCHARATE, AMPHETAMINE ASPARTATE MONOHYDRATE, DEXTROAMPHETAMINE SULFATE AND AMPHETAMINE SULFATE 2.5; 2.5; 2.5; 2.5 MG/1; MG/1; MG/1; MG/1
10 CAPSULE, EXTENDED RELEASE ORAL DAILY
Qty: 30 CAPSULE | Refills: 0 | Status: SHIPPED | OUTPATIENT
Start: 2024-07-07 | End: 2024-08-06

## 2024-06-07 RX ORDER — DEXTROAMPHETAMINE SACCHARATE, AMPHETAMINE ASPARTATE MONOHYDRATE, DEXTROAMPHETAMINE SULFATE AND AMPHETAMINE SULFATE 2.5; 2.5; 2.5; 2.5 MG/1; MG/1; MG/1; MG/1
10 CAPSULE, EXTENDED RELEASE ORAL DAILY
Qty: 30 CAPSULE | Refills: 0 | Status: SHIPPED | OUTPATIENT
Start: 2024-08-06 | End: 2024-09-05

## 2024-06-07 RX ORDER — DEXTROAMPHETAMINE SACCHARATE, AMPHETAMINE ASPARTATE MONOHYDRATE, DEXTROAMPHETAMINE SULFATE AND AMPHETAMINE SULFATE 2.5; 2.5; 2.5; 2.5 MG/1; MG/1; MG/1; MG/1
10 CAPSULE, EXTENDED RELEASE ORAL DAILY
Qty: 30 CAPSULE | Refills: 0 | Status: SHIPPED | OUTPATIENT
Start: 2024-06-07 | End: 2024-07-07

## 2024-06-07 ASSESSMENT — PATIENT HEALTH QUESTIONNAIRE - PHQ9
SUM OF ALL RESPONSES TO PHQ9 QUESTIONS 1 & 2: 2
SUM OF ALL RESPONSES TO PHQ QUESTIONS 1-9: 8
10. IF YOU CHECKED OFF ANY PROBLEMS, HOW DIFFICULT HAVE THESE PROBLEMS MADE IT FOR YOU TO DO YOUR WORK, TAKE CARE OF THINGS AT HOME, OR GET ALONG WITH OTHER PEOPLE: SOMEWHAT DIFFICULT
SUM OF ALL RESPONSES TO PHQ QUESTIONS 1-9: 8
1. LITTLE INTEREST OR PLEASURE IN DOING THINGS: SEVERAL DAYS
2. FEELING DOWN, DEPRESSED OR HOPELESS: SEVERAL DAYS
7. TROUBLE CONCENTRATING ON THINGS, SUCH AS READING THE NEWSPAPER OR WATCHING TELEVISION: NOT AT ALL
9. THOUGHTS THAT YOU WOULD BE BETTER OFF DEAD, OR OF HURTING YOURSELF: NOT AT ALL
8. MOVING OR SPEAKING SO SLOWLY THAT OTHER PEOPLE COULD HAVE NOTICED. OR THE OPPOSITE, BEING SO FIGETY OR RESTLESS THAT YOU HAVE BEEN MOVING AROUND A LOT MORE THAN USUAL: NOT AT ALL
6. FEELING BAD ABOUT YOURSELF - OR THAT YOU ARE A FAILURE OR HAVE LET YOURSELF OR YOUR FAMILY DOWN: NOT AT ALL
5. POOR APPETITE OR OVEREATING: NOT AT ALL
3. TROUBLE FALLING OR STAYING ASLEEP: NEARLY EVERY DAY
SUM OF ALL RESPONSES TO PHQ QUESTIONS 1-9: 8
SUM OF ALL RESPONSES TO PHQ QUESTIONS 1-9: 8
4. FEELING TIRED OR HAVING LITTLE ENERGY: NEARLY EVERY DAY

## 2024-06-07 ASSESSMENT — ENCOUNTER SYMPTOMS
GASTROINTESTINAL NEGATIVE: 1
RESPIRATORY NEGATIVE: 1
EYES NEGATIVE: 1

## 2024-06-07 NOTE — PROGRESS NOTES
Aylin El, was evaluated through a synchronous (real-time) audio-video encounter. The patient (or guardian if applicable) is aware that this is a billable service, which includes applicable co-pays. This Virtual Visit was conducted with patient's (and/or legal guardian's) consent. Patient identification was verified, and a caregiver was present when appropriate.   The patient was located at Home: [address]  [address]  [place] [address]  Provider was located at Facility (Appt Dept): [address]  [place] [address]  Confirm you are appropriately licensed, registered, or certified to deliver care in the state where the patient is located as indicated above. If you are not or unsure, please re-schedule the visit: Yes, I confirm.     Aylin El (:  1995) is a Established patient, presenting virtually for evaluation of the following:    Assessment & Plan   Below is the assessment and plan developed based on review of pertinent history, physical exam, labs, studies, and medications.  1. Attention deficit hyperactivity disorder (ADHD), unspecified ADHD type    .   I have reviewed this patient's report generated by the [place] Pres[facility] Monitoring [facility] which does not demonstrate aberrancies and inconsistencies with regard to the historical prescribing of controlled medications to this patient by other providers    -     amphetamine-dextroamphetamine (ADDERALL XR) 10 MG extended release capsule; Take 1 capsule by mouth daily for 30 days. Max Daily Amount: 10 mg, Disp-30 capsule, R-0Normal  -     amphetamine-dextroamphetamine (ADDERALL XR) 10 MG extended release capsule; Take 1 capsule by mouth daily for 30 days. Max Daily Amount: 10 mg, Disp-30 capsule, R-0Normal  -     amphetamine-dextroamphetamine (ADDERALL XR) 10 MG extended release capsule; Take 1 capsule by mouth daily for 30 days. Max Daily Amount: 10 mg, Disp-30 capsule, R-0Normal    Follow-up in 3 months.  2.

## 2024-06-12 ENCOUNTER — OFFICE VISIT (OUTPATIENT)
Age: 29
End: 2024-06-12
Payer: MEDICAID

## 2024-06-12 VITALS
DIASTOLIC BLOOD PRESSURE: 72 MMHG | HEIGHT: 59 IN | BODY MASS INDEX: 23.79 KG/M2 | WEIGHT: 118 LBS | SYSTOLIC BLOOD PRESSURE: 118 MMHG

## 2024-06-12 DIAGNOSIS — Z30.9 ENCOUNTER FOR CONTRACEPTIVE MANAGEMENT, UNSPECIFIED TYPE: ICD-10-CM

## 2024-06-12 DIAGNOSIS — N94.6 DYSMENORRHEA: Primary | ICD-10-CM

## 2024-06-12 PROCEDURE — 99213 OFFICE O/P EST LOW 20 MIN: CPT | Performed by: OBSTETRICS & GYNECOLOGY

## 2024-07-24 ENCOUNTER — OFFICE VISIT (OUTPATIENT)
Age: 29
End: 2024-07-24

## 2024-07-24 VITALS
DIASTOLIC BLOOD PRESSURE: 70 MMHG | HEIGHT: 59 IN | WEIGHT: 114 LBS | BODY MASS INDEX: 22.98 KG/M2 | SYSTOLIC BLOOD PRESSURE: 112 MMHG

## 2024-07-24 DIAGNOSIS — N89.8 VAGINAL IRRITATION: Primary | ICD-10-CM

## 2024-07-24 DIAGNOSIS — Z72.51 UNPROTECTED SEXUAL INTERCOURSE: ICD-10-CM

## 2024-07-24 DIAGNOSIS — Z11.3 SCREENING FOR STD (SEXUALLY TRANSMITTED DISEASE): ICD-10-CM

## 2024-07-24 DIAGNOSIS — N89.8 VAGINAL ITCHING: ICD-10-CM

## 2024-07-24 DIAGNOSIS — R30.0 DYSURIA: ICD-10-CM

## 2024-07-24 PROCEDURE — 99213 OFFICE O/P EST LOW 20 MIN: CPT | Performed by: OBSTETRICS & GYNECOLOGY

## 2024-07-24 NOTE — PROGRESS NOTES
Chief Complaint   Patient presents with    Vaginal Itching       Ob/Gyn Hx:  G0  LMP - Patient's last menstrual period was 07/20/2024 (approximate).  Menses - regular   Contraception - condoms sometimes.  Hx of STI - No    SA - Yes, male partner.     Health Maintenance:  Last Pap: never had one. Already scheduled for her annual exam in August.    1. Have you been to the ER, urgent care clinic, or hospitalized since your last visit?No    2. Have you seen or consulted any other health care providers outside of the [facility] since your last visit? No    Patient declines chaperone.    Nadege Clifford MA

## 2024-07-24 NOTE — PROGRESS NOTES
Problem Visit    Chief Complaint   Patient presents with   • Vaginal Itching       Aylin El is a 29 y.o.  presenting for problem visit. Her main concern today is vaginal irritation that has been ongoing for a few days.    She notes vaginal itching. She denies discharge or odor. She would like STI testing in addition to BV and yeast. She is using condoms occasionally. She had a period 7/20/24.  She is still interested in the Mirena IUD for contraception. She does not currently have health insurance so is worried about the cost.    She was last seen 6/12/24 for a problem visit. Her main concern at that time was painful periods. Per OV, she notes periods have always been very painful with significant cramping. She has a chronic history of dysmenorrhea. She previously noted good pain relief with ibuprofen and tylenol. She does have a family history significant for endometriosis. She notes that she has previously tried the patch but stopped it due to feelings of depression. She is sexually active.  She is most interested in Mirena IUD\"     Past Medical History:   Diagnosis Date   • ADHD    • Asthma    • Depression    • Headache    • Ovarian cyst    • Psychotic disorder (HCC)        History reviewed. No pertinent surgical history.    Family History   Problem Relation Age of Onset   • No Known Problems Mother    • Cancer Father         brain ca   • Lung Disease Father         COPD       Social History     Socioeconomic History   • Marital status: Single     Spouse name: Not on file   • Number of children: Not on file   • Years of education: Not on file   • Highest education level: Not on file   Occupational History   • Not on file   Tobacco Use   • Smoking status: Never   • Smokeless tobacco: Never   Vaping Use   • Vaping Use: Never used   Substance and Sexual Activity   • Alcohol use: Never   • Drug use: Never   • Sexual activity: Yes     Partners: Male     Birth control/protection: None, Condom Male     Comment:

## 2024-07-25 LAB
BACTERIA SPEC CULT: NORMAL
SERVICE CMNT-IMP: NORMAL

## 2024-07-28 LAB
A VAGINAE DNA VAG QL NAA+PROBE: ABNORMAL SCORE
BVAB2 DNA VAG QL NAA+PROBE: ABNORMAL SCORE
C ALBICANS DNA VAG QL NAA+PROBE: POSITIVE
C GLABRATA DNA VAG QL NAA+PROBE: NEGATIVE
C TRACH DNA SPEC QL NAA+PROBE: NEGATIVE
MEGA1 DNA VAG QL NAA+PROBE: ABNORMAL SCORE
N GONORRHOEA DNA VAG QL NAA+PROBE: NEGATIVE
T VAGINALIS DNA VAG QL NAA+PROBE: NEGATIVE

## 2024-07-29 RX ORDER — FLUCONAZOLE 150 MG/1
150 TABLET ORAL
Qty: 2 TABLET | Refills: 0 | Status: SHIPPED | OUTPATIENT
Start: 2024-07-29 | End: 2024-08-02

## 2024-09-13 ENCOUNTER — TELEPHONE (OUTPATIENT)
Age: 29
End: 2024-09-13

## 2024-09-17 ENCOUNTER — TELEPHONE (OUTPATIENT)
Age: 29
End: 2024-09-17

## 2024-09-17 ENCOUNTER — OFFICE VISIT (OUTPATIENT)
Age: 29
End: 2024-09-17

## 2024-09-17 VITALS
TEMPERATURE: 98.7 F | HEART RATE: 77 BPM | DIASTOLIC BLOOD PRESSURE: 78 MMHG | BODY MASS INDEX: 22.34 KG/M2 | WEIGHT: 110.6 LBS | RESPIRATION RATE: 16 BRPM | SYSTOLIC BLOOD PRESSURE: 125 MMHG | OXYGEN SATURATION: 97 %

## 2024-09-17 DIAGNOSIS — N89.8 VAGINAL DISCHARGE: Primary | ICD-10-CM

## 2024-09-17 DIAGNOSIS — N89.8 VAGINAL ODOR: ICD-10-CM

## 2024-09-17 PROCEDURE — 99213 OFFICE O/P EST LOW 20 MIN: CPT | Performed by: OBSTETRICS & GYNECOLOGY

## 2024-09-20 LAB
A VAGINAE DNA VAG QL NAA+PROBE: ABNORMAL SCORE
BVAB2 DNA VAG QL NAA+PROBE: ABNORMAL SCORE
C ALBICANS DNA VAG QL NAA+PROBE: NEGATIVE
C GLABRATA DNA VAG QL NAA+PROBE: NEGATIVE
C TRACH DNA SPEC QL NAA+PROBE: NEGATIVE
MEGA1 DNA VAG QL NAA+PROBE: ABNORMAL SCORE
N GONORRHOEA DNA VAG QL NAA+PROBE: NEGATIVE
T VAGINALIS DNA VAG QL NAA+PROBE: NEGATIVE

## 2024-09-20 RX ORDER — METRONIDAZOLE 500 MG/1
500 TABLET ORAL 2 TIMES DAILY
Qty: 14 TABLET | Refills: 0 | Status: SHIPPED | OUTPATIENT
Start: 2024-09-20 | End: 2024-09-27

## 2024-10-28 DIAGNOSIS — F90.9 ATTENTION DEFICIT HYPERACTIVITY DISORDER (ADHD), UNSPECIFIED ADHD TYPE: ICD-10-CM

## 2024-10-28 DIAGNOSIS — G44.211 EPISODIC TENSION-TYPE HEADACHE, INTRACTABLE: ICD-10-CM

## 2024-10-28 RX ORDER — DEXTROAMPHETAMINE SACCHARATE, AMPHETAMINE ASPARTATE MONOHYDRATE, DEXTROAMPHETAMINE SULFATE AND AMPHETAMINE SULFATE 2.5; 2.5; 2.5; 2.5 MG/1; MG/1; MG/1; MG/1
10 CAPSULE, EXTENDED RELEASE ORAL DAILY
Qty: 30 CAPSULE | Refills: 0 | OUTPATIENT
Start: 2024-10-28 | End: 2024-11-27

## 2024-10-28 RX ORDER — NAPROXEN 500 MG/1
500 TABLET ORAL 2 TIMES DAILY PRN
Qty: 60 TABLET | Refills: 0 | Status: SHIPPED | OUTPATIENT
Start: 2024-10-28

## 2024-10-28 NOTE — TELEPHONE ENCOUNTER
Last appt 6/7/2024      Next Apt:     No future appointments.      [facility] #[id number] - [place], [place] - [address] - P [phone] - F [phone]  [address] [address]  Phone: [phone] Fax: [phone]

## 2024-11-12 RX ORDER — METRONIDAZOLE 500 MG/1
500 TABLET ORAL 2 TIMES DAILY
Qty: 14 TABLET | Refills: 0 | OUTPATIENT
Start: 2024-11-12 | End: 2024-11-19

## 2025-02-04 NOTE — PROGRESS NOTES
Aylin El is a 29 y.o. female  presents for a problem visit.    Chief Complaint   Patient presents with    Vaginal Discharge       OB/GYN History     Hx of STI - No  SA - Yes, Male      Patient's last menstrual period was 2025.  Menses: Regular every month without spotting, flow is heavy, and usually lasting less than 7 days  Contraception: Condoms        The patient is complaining of the following symptoms: vaginal discharge and vaginal odor      1. Have you been to the ER, urgent care clinic, or hospitalized since your last visit? No  2. Have you seen or consulted any other health care providers outside of the [facility] since your last visit? No      She declines  a chaperone during the gynecologic exam today.

## 2025-02-05 ENCOUNTER — OFFICE VISIT (OUTPATIENT)
Age: 30
End: 2025-02-05

## 2025-02-05 VITALS
WEIGHT: 105.4 LBS | TEMPERATURE: 98.7 F | DIASTOLIC BLOOD PRESSURE: 88 MMHG | OXYGEN SATURATION: 98 % | RESPIRATION RATE: 16 BRPM | HEART RATE: 85 BPM | BODY MASS INDEX: 21.29 KG/M2 | SYSTOLIC BLOOD PRESSURE: 122 MMHG

## 2025-02-05 DIAGNOSIS — N89.8 VAGINAL ODOR: ICD-10-CM

## 2025-02-05 DIAGNOSIS — Z11.3 SCREENING EXAMINATION FOR STD (SEXUALLY TRANSMITTED DISEASE): Primary | ICD-10-CM

## 2025-02-05 DIAGNOSIS — N89.8 VAGINAL DISCHARGE: ICD-10-CM

## 2025-02-05 PROCEDURE — 99213 OFFICE O/P EST LOW 20 MIN: CPT | Performed by: OBSTETRICS & GYNECOLOGY

## 2025-02-05 SDOH — ECONOMIC STABILITY: FOOD INSECURITY: WITHIN THE PAST 12 MONTHS, YOU WORRIED THAT YOUR FOOD WOULD RUN OUT BEFORE YOU GOT MONEY TO BUY MORE.: NEVER TRUE

## 2025-02-05 SDOH — ECONOMIC STABILITY: INCOME INSECURITY: IN THE LAST 12 MONTHS, WAS THERE A TIME WHEN YOU WERE NOT ABLE TO PAY THE MORTGAGE OR RENT ON TIME?: NO

## 2025-02-05 SDOH — ECONOMIC STABILITY: TRANSPORTATION INSECURITY
IN THE PAST 12 MONTHS, HAS THE LACK OF TRANSPORTATION KEPT YOU FROM MEDICAL APPOINTMENTS OR FROM GETTING MEDICATIONS?: NO

## 2025-02-05 SDOH — ECONOMIC STABILITY: FOOD INSECURITY: WITHIN THE PAST 12 MONTHS, THE FOOD YOU BOUGHT JUST DIDN'T LAST AND YOU DIDN'T HAVE MONEY TO GET MORE.: NEVER TRUE

## 2025-02-05 SDOH — ECONOMIC STABILITY: TRANSPORTATION INSECURITY
IN THE PAST 12 MONTHS, HAS LACK OF TRANSPORTATION KEPT YOU FROM MEETINGS, WORK, OR FROM GETTING THINGS NEEDED FOR DAILY LIVING?: NO

## 2025-02-05 ASSESSMENT — PATIENT HEALTH QUESTIONNAIRE - PHQ9
5. POOR APPETITE OR OVEREATING: SEVERAL DAYS
SUM OF ALL RESPONSES TO PHQ QUESTIONS 1-9: 3
6. FEELING BAD ABOUT YOURSELF - OR THAT YOU ARE A FAILURE OR HAVE LET YOURSELF OR YOUR FAMILY DOWN: NOT AT ALL
7. TROUBLE CONCENTRATING ON THINGS, SUCH AS READING THE NEWSPAPER OR WATCHING TELEVISION: NOT AT ALL
6. FEELING BAD ABOUT YOURSELF - OR THAT YOU ARE A FAILURE OR HAVE LET YOURSELF OR YOUR FAMILY DOWN: NOT AT ALL
2. FEELING DOWN, DEPRESSED OR HOPELESS: NOT AT ALL
9. THOUGHTS THAT YOU WOULD BE BETTER OFF DEAD, OR OF HURTING YOURSELF: NOT AT ALL
8. MOVING OR SPEAKING SO SLOWLY THAT OTHER PEOPLE COULD HAVE NOTICED. OR THE OPPOSITE - BEING SO FIDGETY OR RESTLESS THAT YOU HAVE BEEN MOVING AROUND A LOT MORE THAN USUAL: NOT AT ALL
3. TROUBLE FALLING OR STAYING ASLEEP: NOT AT ALL
4. FEELING TIRED OR HAVING LITTLE ENERGY: SEVERAL DAYS
1. LITTLE INTEREST OR PLEASURE IN DOING THINGS: SEVERAL DAYS
7. TROUBLE CONCENTRATING ON THINGS, SUCH AS READING THE NEWSPAPER OR WATCHING TELEVISION: NOT AT ALL
3. TROUBLE FALLING OR STAYING ASLEEP: NOT AT ALL
4. FEELING TIRED OR HAVING LITTLE ENERGY: SEVERAL DAYS
1. LITTLE INTEREST OR PLEASURE IN DOING THINGS: SEVERAL DAYS
9. THOUGHTS THAT YOU WOULD BE BETTER OFF DEAD, OR OF HURTING YOURSELF: NOT AT ALL
SUM OF ALL RESPONSES TO PHQ QUESTIONS 1-9: 3
5. POOR APPETITE OR OVEREATING: SEVERAL DAYS
8. MOVING OR SPEAKING SO SLOWLY THAT OTHER PEOPLE COULD HAVE NOTICED. OR THE OPPOSITE, BEING SO FIGETY OR RESTLESS THAT YOU HAVE BEEN MOVING AROUND A LOT MORE THAN USUAL: NOT AT ALL
2. FEELING DOWN, DEPRESSED OR HOPELESS: NOT AT ALL
SUM OF ALL RESPONSES TO PHQ QUESTIONS 1-9: 3
10. IF YOU CHECKED OFF ANY PROBLEMS, HOW DIFFICULT HAVE THESE PROBLEMS MADE IT FOR YOU TO DO YOUR WORK, TAKE CARE OF THINGS AT HOME, OR GET ALONG WITH OTHER PEOPLE: NOT DIFFICULT AT ALL
10. IF YOU CHECKED OFF ANY PROBLEMS, HOW DIFFICULT HAVE THESE PROBLEMS MADE IT FOR YOU TO DO YOUR WORK, TAKE CARE OF THINGS AT HOME, OR GET ALONG WITH OTHER PEOPLE: NOT DIFFICULT AT ALL
SUM OF ALL RESPONSES TO PHQ9 QUESTIONS 1 & 2: 1

## 2025-02-05 NOTE — PROGRESS NOTES
Problem Visit    Chief Complaint   Patient presents with    Vaginal Discharge       Aylin El is a 29 y.o.  presenting for problem visit. Her main concern today is vaginal discharge and vaginal odor. She notes that the discharge and odor started approximately 10 days ago. She did try over the counter monistat as she was first concerned about possible yeast infection. She reports that the odor was initially fishy.    She also notes a new right sided pain that occurred with her most recent menstrual cycle. She reports that the pain was severe. She reports it is now better. She reports that it is similar to when she was previously diagnosed with ovarian cysts.    She reports regular menstrual periods. She does note that they have gotten heavier.    She is sexually active with male partners. She reports that she usually uses condoms. She did not use contraception with her most recent partner.    She was last seen in 2024 for a problem visit. At that time, her main concern was a new left sided vaginal bump (just proximal to introitus). Per OV, \"She notes that she had some itching last week. She then developed a vaginal bump on . She notes that the bump got bigger. She notes that the bump then started to express pus. She was initially scheduled for a follow up visit on September 10 however she notes that at that time, the bump had gotten smaller and she was not as concerned.\"    OB History    Para Term  AB Living   0 0 0 0 0 0   SAB IAB Ectopic Molar Multiple Live Births   0 0 0 0 0 0       Past Medical History:   Diagnosis Date    ADHD     Asthma     Depression     Headache     Ovarian cyst     Psychotic disorder (HCC)        No past surgical history on file.    Family History   Problem Relation Age of Onset    No Known Problems Mother     Cancer Father         brain ca    Lung Disease Father         COPD       Social History     Socioeconomic History    Marital status:

## 2025-02-10 LAB
A VAGINAE DNA VAG QL NAA+PROBE: ABNORMAL SCORE
BVAB2 DNA VAG QL NAA+PROBE: ABNORMAL SCORE
C ALBICANS DNA VAG QL NAA+PROBE: NEGATIVE
C GLABRATA DNA VAG QL NAA+PROBE: NEGATIVE
C TRACH RRNA SPEC QL NAA+PROBE: NEGATIVE
MEGA1 DNA VAG QL NAA+PROBE: ABNORMAL SCORE
N GONORRHOEA RRNA SPEC QL NAA+PROBE: NEGATIVE
SPECIMEN SOURCE: ABNORMAL
T VAGINALIS RRNA SPEC QL NAA+PROBE: NEGATIVE

## 2025-02-10 RX ORDER — METRONIDAZOLE 500 MG/1
500 TABLET ORAL 2 TIMES DAILY
Qty: 14 TABLET | Refills: 0 | Status: SHIPPED | OUTPATIENT
Start: 2025-02-10 | End: 2025-02-17

## 2025-02-11 ENCOUNTER — TELEPHONE (OUTPATIENT)
Age: 30
End: 2025-02-11